# Patient Record
Sex: FEMALE | Race: OTHER | HISPANIC OR LATINO
[De-identification: names, ages, dates, MRNs, and addresses within clinical notes are randomized per-mention and may not be internally consistent; named-entity substitution may affect disease eponyms.]

---

## 2017-03-02 ENCOUNTER — APPOINTMENT (OUTPATIENT)
Dept: ORTHOPEDIC SURGERY | Facility: CLINIC | Age: 77
End: 2017-03-02

## 2017-03-02 VITALS
DIASTOLIC BLOOD PRESSURE: 68 MMHG | HEART RATE: 74 BPM | WEIGHT: 190 LBS | HEIGHT: 61 IN | SYSTOLIC BLOOD PRESSURE: 159 MMHG | BODY MASS INDEX: 35.87 KG/M2

## 2017-03-02 VITALS — BODY MASS INDEX: 35.87 KG/M2 | WEIGHT: 190 LBS | HEIGHT: 61 IN

## 2017-03-02 DIAGNOSIS — Z87.39 PERSONAL HISTORY OF OTHER DISEASES OF THE MUSCULOSKELETAL SYSTEM AND CONNECTIVE TISSUE: ICD-10-CM

## 2017-03-02 DIAGNOSIS — Z98.890 OTHER SPECIFIED POSTPROCEDURAL STATES: ICD-10-CM

## 2017-03-02 DIAGNOSIS — Z87.09 PERSONAL HISTORY OF OTHER DISEASES OF THE RESPIRATORY SYSTEM: ICD-10-CM

## 2017-03-02 DIAGNOSIS — Z82.61 FAMILY HISTORY OF ARTHRITIS: ICD-10-CM

## 2017-03-02 RX ORDER — DICLOFENAC SODIUM 75 MG/1
75 TABLET, DELAYED RELEASE ORAL
Qty: 60 | Refills: 1 | Status: ACTIVE | COMMUNITY
Start: 2017-03-02 | End: 1900-01-01

## 2017-06-08 ENCOUNTER — APPOINTMENT (OUTPATIENT)
Dept: CARDIOLOGY | Facility: HOSPITAL | Age: 77
End: 2017-06-08

## 2017-06-12 ENCOUNTER — RX RENEWAL (OUTPATIENT)
Age: 77
End: 2017-06-12

## 2017-06-22 ENCOUNTER — APPOINTMENT (OUTPATIENT)
Dept: ORTHOPEDIC SURGERY | Facility: CLINIC | Age: 77
End: 2017-06-22

## 2017-07-06 ENCOUNTER — APPOINTMENT (OUTPATIENT)
Dept: CARDIOLOGY | Facility: HOSPITAL | Age: 77
End: 2017-07-06

## 2017-07-20 ENCOUNTER — OUTPATIENT (OUTPATIENT)
Dept: OUTPATIENT SERVICES | Facility: HOSPITAL | Age: 77
LOS: 1 days | End: 2017-07-20
Payer: COMMERCIAL

## 2017-07-20 ENCOUNTER — APPOINTMENT (OUTPATIENT)
Dept: CARDIOLOGY | Facility: HOSPITAL | Age: 77
End: 2017-07-20

## 2017-07-20 VITALS
OXYGEN SATURATION: 95 % | HEIGHT: 61 IN | WEIGHT: 194 LBS | HEART RATE: 74 BPM | BODY MASS INDEX: 36.63 KG/M2 | DIASTOLIC BLOOD PRESSURE: 75 MMHG | SYSTOLIC BLOOD PRESSURE: 130 MMHG

## 2017-07-20 DIAGNOSIS — I25.10 ATHEROSCLEROTIC HEART DISEASE OF NATIVE CORONARY ARTERY WITHOUT ANGINA PECTORIS: ICD-10-CM

## 2017-07-20 DIAGNOSIS — I35.0 NONRHEUMATIC AORTIC (VALVE) STENOSIS: ICD-10-CM

## 2017-07-20 PROCEDURE — 93005 ELECTROCARDIOGRAM TRACING: CPT

## 2017-07-20 PROCEDURE — G0463: CPT

## 2017-07-27 ENCOUNTER — APPOINTMENT (OUTPATIENT)
Dept: ORTHOPEDIC SURGERY | Facility: CLINIC | Age: 77
End: 2017-07-27
Payer: MEDICARE

## 2017-07-27 VITALS
HEIGHT: 61 IN | DIASTOLIC BLOOD PRESSURE: 75 MMHG | HEART RATE: 64 BPM | SYSTOLIC BLOOD PRESSURE: 149 MMHG | WEIGHT: 194 LBS | BODY MASS INDEX: 36.63 KG/M2

## 2017-07-27 PROCEDURE — 99214 OFFICE O/P EST MOD 30 MIN: CPT

## 2017-07-27 RX ORDER — DICLOFENAC SODIUM 75 MG/1
75 TABLET, DELAYED RELEASE ORAL
Qty: 28 | Refills: 2 | Status: ACTIVE | COMMUNITY
Start: 2017-07-27 | End: 1900-01-01

## 2017-08-12 ENCOUNTER — FORM ENCOUNTER (OUTPATIENT)
Age: 77
End: 2017-08-12

## 2017-08-13 ENCOUNTER — APPOINTMENT (OUTPATIENT)
Dept: MRI IMAGING | Facility: IMAGING CENTER | Age: 77
End: 2017-08-13
Payer: MEDICARE

## 2017-08-13 ENCOUNTER — OUTPATIENT (OUTPATIENT)
Dept: OUTPATIENT SERVICES | Facility: HOSPITAL | Age: 77
LOS: 1 days | End: 2017-08-13
Payer: COMMERCIAL

## 2017-08-13 DIAGNOSIS — Z00.8 ENCOUNTER FOR OTHER GENERAL EXAMINATION: ICD-10-CM

## 2017-08-13 PROCEDURE — 73721 MRI JNT OF LWR EXTRE W/O DYE: CPT | Mod: 26,RT

## 2017-08-13 PROCEDURE — 73721 MRI JNT OF LWR EXTRE W/O DYE: CPT

## 2017-08-31 ENCOUNTER — APPOINTMENT (OUTPATIENT)
Dept: ORTHOPEDIC SURGERY | Facility: CLINIC | Age: 77
End: 2017-08-31
Payer: MEDICARE

## 2017-08-31 VITALS
HEART RATE: 73 BPM | DIASTOLIC BLOOD PRESSURE: 68 MMHG | HEIGHT: 61 IN | BODY MASS INDEX: 35.87 KG/M2 | WEIGHT: 190 LBS | SYSTOLIC BLOOD PRESSURE: 151 MMHG

## 2017-08-31 PROCEDURE — 99214 OFFICE O/P EST MOD 30 MIN: CPT

## 2017-08-31 RX ORDER — DICLOFENAC SODIUM 75 MG/1
75 TABLET, DELAYED RELEASE ORAL
Qty: 60 | Refills: 0 | Status: ACTIVE | COMMUNITY
Start: 2017-08-31 | End: 1900-01-01

## 2017-09-12 ENCOUNTER — OUTPATIENT (OUTPATIENT)
Dept: OUTPATIENT SERVICES | Facility: HOSPITAL | Age: 77
LOS: 1 days | End: 2017-09-12
Payer: COMMERCIAL

## 2017-09-12 ENCOUNTER — APPOINTMENT (OUTPATIENT)
Dept: CV DIAGNOSITCS | Facility: HOSPITAL | Age: 77
End: 2017-09-12

## 2017-09-12 DIAGNOSIS — I35.0 NONRHEUMATIC AORTIC (VALVE) STENOSIS: ICD-10-CM

## 2017-09-12 PROCEDURE — 93306 TTE W/DOPPLER COMPLETE: CPT | Mod: 26

## 2017-09-12 PROCEDURE — 93306 TTE W/DOPPLER COMPLETE: CPT

## 2018-02-09 ENCOUNTER — MEDICATION RENEWAL (OUTPATIENT)
Age: 78
End: 2018-02-09

## 2018-03-30 ENCOUNTER — APPOINTMENT (OUTPATIENT)
Dept: ORTHOPEDIC SURGERY | Facility: CLINIC | Age: 78
End: 2018-03-30

## 2018-05-10 ENCOUNTER — OUTPATIENT (OUTPATIENT)
Dept: OUTPATIENT SERVICES | Facility: HOSPITAL | Age: 78
LOS: 1 days | End: 2018-05-10
Payer: COMMERCIAL

## 2018-05-10 ENCOUNTER — APPOINTMENT (OUTPATIENT)
Dept: CARDIOLOGY | Facility: HOSPITAL | Age: 78
End: 2018-05-10

## 2018-05-10 ENCOUNTER — NON-APPOINTMENT (OUTPATIENT)
Age: 78
End: 2018-05-10

## 2018-05-10 VITALS — HEART RATE: 68 BPM | SYSTOLIC BLOOD PRESSURE: 125 MMHG | DIASTOLIC BLOOD PRESSURE: 67 MMHG | OXYGEN SATURATION: 99 %

## 2018-05-10 DIAGNOSIS — I73.9 PERIPHERAL VASCULAR DISEASE, UNSPECIFIED: ICD-10-CM

## 2018-05-10 DIAGNOSIS — I25.10 ATHEROSCLEROTIC HEART DISEASE OF NATIVE CORONARY ARTERY WITHOUT ANGINA PECTORIS: ICD-10-CM

## 2018-05-10 PROCEDURE — G0463: CPT

## 2018-05-10 PROCEDURE — 93005 ELECTROCARDIOGRAM TRACING: CPT

## 2018-05-31 ENCOUNTER — APPOINTMENT (OUTPATIENT)
Dept: ORTHOPEDIC SURGERY | Facility: CLINIC | Age: 78
End: 2018-05-31
Payer: MEDICARE

## 2018-05-31 VITALS
SYSTOLIC BLOOD PRESSURE: 152 MMHG | DIASTOLIC BLOOD PRESSURE: 61 MMHG | HEART RATE: 64 BPM | HEIGHT: 61 IN | BODY MASS INDEX: 35.5 KG/M2 | WEIGHT: 188 LBS

## 2018-05-31 PROCEDURE — 73562 X-RAY EXAM OF KNEE 3: CPT | Mod: RT

## 2018-05-31 PROCEDURE — 99214 OFFICE O/P EST MOD 30 MIN: CPT

## 2018-06-06 ENCOUNTER — APPOINTMENT (OUTPATIENT)
Dept: ULTRASOUND IMAGING | Facility: HOSPITAL | Age: 78
End: 2018-06-06

## 2018-06-06 ENCOUNTER — OUTPATIENT (OUTPATIENT)
Dept: OUTPATIENT SERVICES | Facility: HOSPITAL | Age: 78
LOS: 1 days | End: 2018-06-06
Payer: COMMERCIAL

## 2018-06-06 DIAGNOSIS — I65.29 OCCLUSION AND STENOSIS OF UNSPECIFIED CAROTID ARTERY: ICD-10-CM

## 2018-06-06 DIAGNOSIS — I73.9 PERIPHERAL VASCULAR DISEASE, UNSPECIFIED: ICD-10-CM

## 2018-06-06 PROCEDURE — 93880 EXTRACRANIAL BILAT STUDY: CPT | Mod: 26

## 2018-06-06 PROCEDURE — 93923 UPR/LXTR ART STDY 3+ LVLS: CPT | Mod: 26

## 2018-06-06 PROCEDURE — 93880 EXTRACRANIAL BILAT STUDY: CPT

## 2018-06-06 PROCEDURE — 93923 UPR/LXTR ART STDY 3+ LVLS: CPT

## 2018-06-15 RX ORDER — HYALURONATE SODIUM 20 MG/2 ML
20 SYRINGE (ML) INTRAARTICULAR
Qty: 3 | Refills: 0 | Status: ACTIVE | OUTPATIENT
Start: 2018-06-15

## 2018-06-19 ENCOUNTER — APPOINTMENT (OUTPATIENT)
Dept: PULMONOLOGY | Facility: CLINIC | Age: 78
End: 2018-06-19
Payer: MEDICARE

## 2018-06-19 VITALS
DIASTOLIC BLOOD PRESSURE: 70 MMHG | WEIGHT: 192 LBS | SYSTOLIC BLOOD PRESSURE: 110 MMHG | OXYGEN SATURATION: 98 % | HEART RATE: 59 BPM | BODY MASS INDEX: 36.28 KG/M2

## 2018-06-19 DIAGNOSIS — J45.909 UNSPECIFIED ASTHMA, UNCOMPLICATED: ICD-10-CM

## 2018-06-19 DIAGNOSIS — R06.02 SHORTNESS OF BREATH: ICD-10-CM

## 2018-06-19 PROCEDURE — 99214 OFFICE O/P EST MOD 30 MIN: CPT | Mod: 25

## 2018-06-19 PROCEDURE — 94727 GAS DIL/WSHOT DETER LNG VOL: CPT

## 2018-06-19 PROCEDURE — 94729 DIFFUSING CAPACITY: CPT

## 2018-06-19 PROCEDURE — 94060 EVALUATION OF WHEEZING: CPT

## 2018-06-27 PROBLEM — J45.909 AIRWAY HYPERREACTIVITY: Status: ACTIVE | Noted: 2018-06-27

## 2018-06-28 ENCOUNTER — APPOINTMENT (OUTPATIENT)
Dept: ORTHOPEDIC SURGERY | Facility: CLINIC | Age: 78
End: 2018-06-28
Payer: MEDICARE

## 2018-06-28 PROCEDURE — 20610 DRAIN/INJ JOINT/BURSA W/O US: CPT | Mod: RT

## 2018-07-05 ENCOUNTER — APPOINTMENT (OUTPATIENT)
Dept: ORTHOPEDIC SURGERY | Facility: CLINIC | Age: 78
End: 2018-07-05
Payer: MEDICARE

## 2018-07-05 VITALS
WEIGHT: 190 LBS | HEART RATE: 68 BPM | HEIGHT: 61 IN | SYSTOLIC BLOOD PRESSURE: 159 MMHG | DIASTOLIC BLOOD PRESSURE: 63 MMHG | BODY MASS INDEX: 35.87 KG/M2

## 2018-07-05 DIAGNOSIS — M23.303 OTHER MENISCUS DERANGEMENTS, UNSPECIFIED MEDIAL MENISCUS, RIGHT KNEE: ICD-10-CM

## 2018-07-05 PROCEDURE — 20610 DRAIN/INJ JOINT/BURSA W/O US: CPT | Mod: RT

## 2018-07-09 ENCOUNTER — APPOINTMENT (OUTPATIENT)
Dept: ORTHOPEDIC SURGERY | Facility: HOSPITAL | Age: 78
End: 2018-07-09

## 2018-07-12 ENCOUNTER — APPOINTMENT (OUTPATIENT)
Dept: ORTHOPEDIC SURGERY | Facility: CLINIC | Age: 78
End: 2018-07-12
Payer: MEDICARE

## 2018-07-12 VITALS
WEIGHT: 190 LBS | BODY MASS INDEX: 35.87 KG/M2 | DIASTOLIC BLOOD PRESSURE: 73 MMHG | HEIGHT: 61 IN | SYSTOLIC BLOOD PRESSURE: 153 MMHG | HEART RATE: 71 BPM

## 2018-07-12 PROCEDURE — 20610 DRAIN/INJ JOINT/BURSA W/O US: CPT | Mod: RT

## 2019-03-28 ENCOUNTER — APPOINTMENT (OUTPATIENT)
Dept: ORTHOPEDIC SURGERY | Facility: CLINIC | Age: 79
End: 2019-03-28

## 2019-09-19 ENCOUNTER — APPOINTMENT (OUTPATIENT)
Dept: ORTHOPEDIC SURGERY | Facility: CLINIC | Age: 79
End: 2019-09-19
Payer: MEDICARE

## 2019-09-19 VITALS
WEIGHT: 178 LBS | BODY MASS INDEX: 33.61 KG/M2 | SYSTOLIC BLOOD PRESSURE: 145 MMHG | DIASTOLIC BLOOD PRESSURE: 72 MMHG | HEIGHT: 61 IN | HEART RATE: 72 BPM

## 2019-09-19 DIAGNOSIS — M25.551 PAIN IN RIGHT HIP: ICD-10-CM

## 2019-09-19 PROCEDURE — 99214 OFFICE O/P EST MOD 30 MIN: CPT

## 2019-09-19 PROCEDURE — 72100 X-RAY EXAM L-S SPINE 2/3 VWS: CPT

## 2019-09-19 PROCEDURE — 73562 X-RAY EXAM OF KNEE 3: CPT | Mod: LT

## 2019-09-19 PROCEDURE — 72170 X-RAY EXAM OF PELVIS: CPT | Mod: RT

## 2019-09-19 RX ORDER — HYALURONATE SODIUM 20 MG/2 ML
20 SYRINGE (ML) INTRAARTICULAR
Qty: 2 | Refills: 0 | Status: ACTIVE | COMMUNITY
Start: 2019-09-19

## 2019-09-23 NOTE — PHYSICAL EXAM
[Antalgic] : antalgic [LE] : Sensory: Intact in bilateral lower extremities [Knee] : patellar 2+ and symmetric bilaterally [Ankle] : ankle 2+ and symmetric bilaterally [DP] : dorsalis pedis 2+ and symmetric bilaterally [PT] : posterior tibial 2+ and symmetric bilaterally [de-identified] : On general examination the patient is adequately groomed and nourished. The vital parameters are as recorded. \par There is no lymphedema or diffuse swelling, no varicose veins, no skin warmth/erythema/scars/swelling, no ulcers and no palpable lymph nodes or masses in both lower extremities. Bilateral pedal pulses are well palpable.\par Upper Extremity:\par Both right and left upper extremities are unremarkable in terms of skin rash, lesions, pigmentation, redness, tenderness, swelling, joint instability, abnormal deformity or crepitus. The overall range of motion, sensation, motor tone and strength testing are normal.\par \par Bilateral Knee Exam: \par The gait is bilateral stiff knee antalgic.\par Knee alignment:            Right 5 degrees varus with no flexion deformity. \par Left 5 degrees valgus with no flexion deformity.\par Both knees demonstrate no scars and the skin has no warmth, erythema, swelling or tenderness. \par Both knees have a range of motion of\par Extension:                    Right 0 degrees     Left 0 degrees\par Flexion:                                   Right 110 degrees      Left 110 degrees\par There is bilateral knee medial joint line tenderness. There is bilateral knee mild effusion. \par Romy's test is positive. Jose test is positive.\par Lachman's test, Anterior/Posterior Drawer test and Pivot Shift Tests are negative. \par There is bilateral knee grade 1 MCL mediolateral laxity and no anteroposterior instability. \par Patella compression test is positive and patellofemoral tracking is normal with no lateral subluxation, apprehension or instability. \par Right knee quadriceps and hamstrings power is 4+.\par Left knee quadriceps and hamstrings power is 4+.\par \par Hip Exam:\par The gait and station is normal\par The patient has equal leg lengths and no pelvic tilt. Dave/Kashif test is 7 inches on the right and 7 inches on the left. Active SLR is 60 degrees on the right and 60 degrees on the left. Both hips demonstrate no scars and the skin has no signs of inflammation or tenderness. \par Both Hips have a normal range of motion of flexion to 100 degrees, abduction 40 degrees, adduction 20 degrees, external rotation 40 degrees, internal rotation 20 degrees with symmetrical motion in flexion and extension. There is no flexion contracture, deformity or instability. Labral impingement tests are negative.\par Both hips flexor, abductor and extensor power is normal.\par \par Spine Exam:\par There is mild curvature of the spine with loss of normal lumbar lordosis. The skin is devoid of erythema, scars, pigmentation or rashes. There is mild lumbar spasm and tenderness especially at L4-L5 or L5-S1. \par The range of motion of the lumbar spine is limited by pain and spasm. Forward flexion is 80% normal, extension catch positive, lateral flexion and rotation 80% normal. There is no lumbar spine imbalance or instability detected.\par Bilateral passive SLR is right 40 degrees, left 40 degrees in supine and sitting positions. Lasegue's Test is negative.\par Neurology:\par The patient is alert and oriented in person, place and time. The mood is calm and affect is normal.\par Testing for coordination including Rhomberg's Test and Finger-Nose Test, sensation, motor tone and power and deep tendon reflexes in both lower extremities is normal.  [de-identified] : The following radiographs were ordered and read by me during this patients visit. I reviewed each radiograph in detail with the patient and discussed the findings as highlighted below. \par AP, lateral and skyline views of the bilateral knees confirm advanced degenerative joint disease with medial and lateral joint space narrowing and osteophyte formation, right varus, left valgus \par AP view of the pelvis are within normal limits \par AP and Lateral Radiographs of the lumbar spine reveal mild DJD.

## 2019-09-23 NOTE — HISTORY OF PRESENT ILLNESS
[8] : an average pain level of 8/10 [de-identified] : Ms. GERALD ORTIZ is a 79 year old female presenting with bilateral knee pain. She localizes the pain to the medial and anterior aspect of the bilateral knee. The patient describes the pain as dull and achy but sharp at times, and states it is intermittent, based on activity. She states the pain is exacerbated by walking long distance, climbing/descending stairs, and rising from a seated position. She has been taking NSAIDs for pain with only mild temporary relief. She admits to prior conservative management inclusive of physical therapy with only mild improvement in condition. Had gel injections to the right knee which worked well for many months but now pain has returned. Patient admits to some intermittent lower back pain. The patient admits to limitations in their quality of life, and is present to discuss options for treatment. JTM. \par Patient also complains of some right groin pain, and feels that the right leg is shorter than the left. \par Patient denies any other pertinent past medical, surgical, familial, of social history.

## 2019-09-23 NOTE — DISCUSSION/SUMMARY
[de-identified] : Bilateral knee advanced degenerative joint disease, Lumbar DJD, Potential Plantar Fasciitis\par The natural history and treatment of degenerative arthritis was discussed with the patient at length today. The spectrum of treatment including nonoperative modalities to surgical intervention was elucidated. Noninvasive and nonoperative treatment modalities include weight reduction, activity modification with low impact exercise,  as needed use of acetaminophen or anti-inflammatory medications if tolerated, glucosamine/chondroitin supplements, and physical therapy. Further treatments can include corticosteroid injection and the use of viscosupplementation with hyaluronic acid injections. Definitive surgical treatment can certainly include total joint arthroplasty also. The risks and benefits of each treatment options was discussed and all questions were answered.\par The patient was informed of the findings and recommended conservative management in the form of a home exercise program, activity modifications, stationary bicycling, swimming and weight loss program. A trial of Glucosamine and Chondroiten Sulphate was recommended.\par A prescription for a course of physical therapy was provided.\par Patient will use OTC NSAIDs as needed for pain. \par I have provided the patient with a referral to Dr. Garrison for evaluation of foot pain. \par I have recommended Euflexxa injections to the bilateral knee and the patient agreed to proceed, and the risks, benefits and side effects were discussed. Follow-up appointment was recommended once the injection is received in the office to begin the series \par

## 2019-10-08 ENCOUNTER — APPOINTMENT (OUTPATIENT)
Dept: ORTHOPEDIC SURGERY | Facility: CLINIC | Age: 79
End: 2019-10-08
Payer: MEDICARE

## 2019-10-08 DIAGNOSIS — M72.2 PLANTAR FASCIAL FIBROMATOSIS: ICD-10-CM

## 2019-10-08 DIAGNOSIS — M21.41 FLAT FOOT [PES PLANUS] (ACQUIRED), RIGHT FOOT: ICD-10-CM

## 2019-10-08 DIAGNOSIS — M20.11 HALLUX VALGUS (ACQUIRED), RIGHT FOOT: ICD-10-CM

## 2019-10-08 DIAGNOSIS — M20.12 HALLUX VALGUS (ACQUIRED), LEFT FOOT: ICD-10-CM

## 2019-10-08 DIAGNOSIS — M21.42 FLAT FOOT [PES PLANUS] (ACQUIRED), RIGHT FOOT: ICD-10-CM

## 2019-10-08 DIAGNOSIS — M62.89 OTHER SPECIFIED DISORDERS OF MUSCLE: ICD-10-CM

## 2019-10-08 PROCEDURE — 99214 OFFICE O/P EST MOD 30 MIN: CPT

## 2019-10-08 PROCEDURE — 73630 X-RAY EXAM OF FOOT: CPT | Mod: RT

## 2019-10-08 RX ORDER — MELOXICAM 7.5 MG/1
7.5 TABLET ORAL
Qty: 30 | Refills: 0 | Status: ACTIVE | COMMUNITY
Start: 2019-10-08 | End: 1900-01-01

## 2019-10-17 ENCOUNTER — APPOINTMENT (OUTPATIENT)
Dept: ORTHOPEDIC SURGERY | Facility: CLINIC | Age: 79
End: 2019-10-17
Payer: MEDICARE

## 2019-10-17 DIAGNOSIS — S83.8X1A SPRAIN OF OTHER SPECIFIED PARTS OF RIGHT KNEE, INITIAL ENCOUNTER: ICD-10-CM

## 2019-10-17 PROCEDURE — 20610 DRAIN/INJ JOINT/BURSA W/O US: CPT | Mod: LT

## 2019-10-17 NOTE — PROCEDURE
[Injection] : Injection [Bilateral] : of bilateral [Knee Joint] : knee joint [Osteoarthritis] : Osteoarthritis [Patient] : patient [Inflammation] : Inflammation [Alternatives] : alternatives [Risk] : risk [Benefits] : benefits [Infection] : infection [Bleeding] : bleeding [Allergic Reaction] : allergic reaction [Verbal Consent Obtained] : verbal consent was obtained prior to the procedure [Alcohol] : Alcohol [Ethyl Chloride Spray] : ethyl chloride spray was used as a topical anesthetic [Betadine] : Betadine [22] : a 22-gauge [2 mL Euflexxa___(lot #)] : 2mL Euflexxa ~Ulot# [unfilled] [Bandage Applied] : a bandage [Tolerated Well] : The patient tolerated the procedure well [None] : none

## 2019-10-24 ENCOUNTER — APPOINTMENT (OUTPATIENT)
Dept: ORTHOPEDIC SURGERY | Facility: CLINIC | Age: 79
End: 2019-10-24
Payer: MEDICARE

## 2019-10-24 PROCEDURE — 20610 DRAIN/INJ JOINT/BURSA W/O US: CPT | Mod: LT

## 2019-10-24 NOTE — PROCEDURE
[Bilateral] : of bilateral [Injection] : Injection [Knee Joint] : knee joint [Osteoarthritis] : Osteoarthritis [Patient] : patient [Inflammation] : Inflammation [Risk] : risk [Benefits] : benefits [Alternatives] : alternatives [Bleeding] : bleeding [Infection] : infection [Verbal Consent Obtained] : verbal consent was obtained prior to the procedure [Allergic Reaction] : allergic reaction [Alcohol] : Alcohol [Betadine] : Betadine [Ethyl Chloride Spray] : ethyl chloride spray was used as a topical anesthetic [22] : a 22-gauge [2 mL Euflexxa___(lot #)] : 2mL Euflexxa ~Ulot# [unfilled] [Tolerated Well] : The patient tolerated the procedure well [Bandage Applied] : a bandage [None] : none [de-identified] : The patient received the second set of injections to bilateral knees of Euflexxa and tolerated well. \par The patient has been instructed to ice and elevate to alleviate/reduce swelling. \par follow up appointment recommended next week for the third set of injections to bilateral knees.\par

## 2019-10-31 ENCOUNTER — APPOINTMENT (OUTPATIENT)
Dept: ORTHOPEDIC SURGERY | Facility: CLINIC | Age: 79
End: 2019-10-31
Payer: MEDICARE

## 2019-10-31 PROCEDURE — 20610 DRAIN/INJ JOINT/BURSA W/O US: CPT | Mod: LT

## 2019-11-01 PROBLEM — M20.11 HALLUX VALGUS OF RIGHT FOOT: Status: ACTIVE | Noted: 2019-11-01

## 2019-11-01 PROBLEM — M72.2 PLANTAR FASCIITIS: Status: RESOLVED | Noted: 2019-09-19 | Resolved: 2019-11-01

## 2019-11-01 PROBLEM — M72.2 PLANTAR FASCIITIS: Status: ACTIVE | Noted: 2019-11-01

## 2019-11-01 PROBLEM — M21.41 ACQUIRED PES PLANUS OF BOTH FEET: Status: ACTIVE | Noted: 2019-11-01

## 2019-11-01 PROBLEM — M62.89 TIGHTNESS OF BOTH GASTROCNEMIUS MUSCLES: Status: ACTIVE | Noted: 2019-11-01

## 2019-11-01 PROBLEM — M20.12 HALLUX VALGUS OF LEFT FOOT: Status: ACTIVE | Noted: 2019-11-01

## 2019-12-09 ENCOUNTER — APPOINTMENT (OUTPATIENT)
Dept: ORTHOPEDIC SURGERY | Facility: CLINIC | Age: 79
End: 2019-12-09

## 2019-12-17 NOTE — PROCEDURE
[Injection] : Injection [Bilateral] : of bilateral [Knee Joint] : knee joint [Osteoarthritis] : Osteoarthritis [Inflammation] : Inflammation [Patient] : patient [Risk] : risk [Benefits] : benefits [Alternatives] : alternatives [Bleeding] : bleeding [Infection] : infection [Allergic Reaction] : allergic reaction [Verbal Consent Obtained] : verbal consent was obtained prior to the procedure [Alcohol] : Alcohol [Betadine] : Betadine [Ethyl Chloride Spray] : ethyl chloride spray was used as a topical anesthetic [22] : a 22-gauge [2 mL Euflexxa___(lot #)] : 2mL Euflexxa ~Ulot# [unfilled] [Bandage Applied] : a bandage [Tolerated Well] : The patient tolerated the procedure well [None] : none [de-identified] : The patient received the third set of injections to bilateral knees of Euflexxa and tolerated well. \par The patient has been instructed to ice and elevate to alleviate/reduce swelling. \par follow up appointment recommended in four to six months. \par

## 2020-01-16 ENCOUNTER — APPOINTMENT (OUTPATIENT)
Dept: ORTHOPEDIC SURGERY | Facility: CLINIC | Age: 80
End: 2020-01-16
Payer: MEDICARE

## 2020-01-16 VITALS — DIASTOLIC BLOOD PRESSURE: 79 MMHG | HEART RATE: 67 BPM | SYSTOLIC BLOOD PRESSURE: 145 MMHG

## 2020-01-16 DIAGNOSIS — M47.816 SPONDYLOSIS W/OUT MYELOPATHY OR RADICULOPATHY, LUMBAR REGION: ICD-10-CM

## 2020-01-16 PROCEDURE — 99213 OFFICE O/P EST LOW 20 MIN: CPT

## 2020-01-21 NOTE — HISTORY OF PRESENT ILLNESS
[8] : an average pain level of 8/10 [Worsening] : worsening [Constant] : ~He/She~ states the symptoms seem to be constant [Walking] : worsened by walking [Rest] : relieved by rest [de-identified] : Ms. GERALD ORTIZ is a 79 year old female presenting with bilateral knee pain. She was last see in October, and received Euflexxa injections to both knees, and notes she has not had relief.  She states her symptoms have worsened since her last visit. She localizes the pain to the medial and anterior aspect of the bilateral knee. The patient describes the pain as dull and achy but sharp at times, and states it is intermittent, based on activity. She states the pain is exacerbated by walking long distance, climbing/descending stairs, and rising from a seated position. She has been taking NSAIDs for pain with only mild temporary relief. She admits to prior conservative management inclusive of physical therapy with only mild improvement in condition, and notes she does not feel this treatment is helpful for her. She was also seen by Dr. Garrison for her foot pain, and recommended to see a spine physician due to paraesthesias. Patient admits to some intermittent lower back pain and a history of spine surgery in the past. \par The patient admits to limitations in their quality of life, and is present to discuss options for treatment. She understands she may need total knee replacement, but would like to avoid this if she can and is looking for other non surgical options for treatment. ELIAZAR.

## 2020-01-21 NOTE — REASON FOR VISIT
[Follow-Up Visit] : a follow-up visit for [Knee Pain] : knee pain [FreeTextEntry2] : bilateral knee pain

## 2020-01-21 NOTE — PHYSICAL EXAM
[Antalgic] : antalgic [LE] : Sensory: Intact in bilateral lower extremities [Knee] : patellar 2+ and symmetric bilaterally [Ankle] : ankle 2+ and symmetric bilaterally [DP] : dorsalis pedis 2+ and symmetric bilaterally [PT] : posterior tibial 2+ and symmetric bilaterally [de-identified] : On general examination the patient is adequately groomed and nourished. The vital parameters are as recorded. \par There is no lymphedema or diffuse swelling, no varicose veins, no skin warmth/erythema/scars/swelling, no ulcers and no palpable lymph nodes or masses in both lower extremities. Bilateral pedal pulses are well palpable.\par Upper Extremity:\par Both right and left upper extremities are unremarkable in terms of skin rash, lesions, pigmentation, redness, tenderness, swelling, joint instability, abnormal deformity or crepitus. The overall range of motion, sensation, motor tone and strength testing are normal.\par \par Bilateral Knee Exam: \par The gait is bilateral stiff knee antalgic.\par Knee alignment:            Right 5 degrees varus with no flexion deformity. \par Left 5 degrees valgus with no flexion deformity.\par Both knees demonstrate no scars and the skin has no warmth, erythema, swelling or tenderness. \par Both knees have a range of motion of\par Extension:                    Right 0 degrees     Left 0 degrees\par Flexion:                                   Right 100 degrees      Left 100 degrees\par There is right knee medial joint line tenderness, and left knee lateral joint line tenderness.  There is bilateral knee mild effusion. \par Romy's test is positive. Jose test is positive.\par Lachman's test, Anterior/Posterior Drawer test and Pivot Shift Tests are negative. \par There is bilateral knee grade 1 MCL mediolateral laxity and no anteroposterior instability. \par Patella compression test is positive and patellofemoral tracking is normal with no lateral subluxation, apprehension or instability. \par Right knee quadriceps and hamstrings power is 4+.\par Left knee quadriceps and hamstrings power is 4+.\par \par Hip Exam:\par The gait and station is normal\par The patient has equal leg lengths and no pelvic tilt. Dave/Kashif test is 7 inches on the right and 7 inches on the left. Active SLR is 60 degrees on the right and 60 degrees on the left. Both hips demonstrate no scars and the skin has no signs of inflammation or tenderness. \par Both Hips have a normal range of motion of flexion to 100 degrees, abduction 40 degrees, adduction 20 degrees, external rotation 40 degrees, internal rotation 20 degrees with symmetrical motion in flexion and extension. There is no flexion contracture, deformity or instability. Labral impingement tests are negative.\par Both hips flexor, abductor and extensor power is normal.\par \par Spine Exam:\par There is mild curvature of the spine with loss of normal lumbar lordosis. The skin is devoid of erythema, scars, pigmentation or rashes. There is mild lumbar spasm and tenderness especially at L4-L5 or L5-S1. \par The range of motion of the lumbar spine is limited by pain and spasm. Forward flexion is 80% normal, extension catch positive, lateral flexion and rotation 80% normal. There is no lumbar spine imbalance or instability detected.\par Bilateral passive SLR is right 40 degrees, left 40 degrees in supine and sitting positions. Lasegue's Test is negative.\par Neurology:\par The patient is alert and oriented in person, place and time. The mood is calm and affect is normal.\par Testing for coordination including Rhomberg's Test and Finger-Nose Test, sensation, motor tone and power and deep tendon reflexes in both lower extremities is normal.  [de-identified] : The following radiographs were ordered last visit, and reviewed again this visit. I reviewed each radiograph in detail with the patient and discussed the findings as highlighted below. \par AP, lateral and skyline views of the bilateral knees confirm advanced degenerative joint disease with medial and lateral joint space narrowing and osteophyte formation, right varus, left valgus \par AP view of the pelvis are within normal limits \par AP and Lateral Radiographs of the lumbar spine reveal mild DJD.

## 2020-01-21 NOTE — DISCUSSION/SUMMARY
[de-identified] : Bilateral knee advanced degenerative joint disease, Lumbar DJD\par The natural history and treatment of degenerative arthritis was discussed with the patient at length today. The spectrum of treatment including nonoperative modalities to surgical intervention was elucidated. Noninvasive and nonoperative treatment modalities include weight reduction, activity modification with low impact exercise,  as needed use of acetaminophen or anti-inflammatory medications if tolerated, glucosamine/chondroitin supplements, and physical therapy. Further treatments can include corticosteroid injection and the use of viscosupplementation with hyaluronic acid injections. Definitive surgical treatment can certainly include total joint arthroplasty also. The risks and benefits of each treatment options was discussed and all questions were answered.\par The patient was informed of the findings and recommended conservative management in the form of a home exercise program, activity modifications, stationary bicycling, swimming and weight loss program. A trial of Glucosamine and Chondroiten Sulphate was recommended.\par A prescription for a course of physical therapy was provided.\par Patient will use OTC NSAIDs as needed for pain. \par I have provided the patient with a referral to Dr. Hernández for evaluation of the lumbar spine. \par Follow up recommended in 3 months.

## 2020-01-28 ENCOUNTER — APPOINTMENT (OUTPATIENT)
Dept: ORTHOPEDIC SURGERY | Facility: CLINIC | Age: 80
End: 2020-01-28
Payer: MEDICARE

## 2020-01-28 VITALS
HEIGHT: 55 IN | SYSTOLIC BLOOD PRESSURE: 176 MMHG | HEART RATE: 69 BPM | DIASTOLIC BLOOD PRESSURE: 60 MMHG | BODY MASS INDEX: 40.5 KG/M2 | WEIGHT: 175 LBS

## 2020-01-28 DIAGNOSIS — M47.814 SPONDYLOSIS W/OUT MYELOPATHY OR RADICULOPATHY, THORACIC REGION: ICD-10-CM

## 2020-01-28 DIAGNOSIS — M47.812 SPONDYLOSIS W/OUT MYELOPATHY OR RADICULOPATHY, CERVICAL REGION: ICD-10-CM

## 2020-01-28 PROCEDURE — 72040 X-RAY EXAM NECK SPINE 2-3 VW: CPT

## 2020-01-28 PROCEDURE — 99215 OFFICE O/P EST HI 40 MIN: CPT

## 2020-01-28 PROCEDURE — 72070 X-RAY EXAM THORAC SPINE 2VWS: CPT

## 2020-02-13 ENCOUNTER — APPOINTMENT (OUTPATIENT)
Dept: ORTHOPEDIC SURGERY | Facility: CLINIC | Age: 80
End: 2020-02-13
Payer: MEDICARE

## 2020-02-13 VITALS
WEIGHT: 175 LBS | HEIGHT: 55 IN | DIASTOLIC BLOOD PRESSURE: 71 MMHG | SYSTOLIC BLOOD PRESSURE: 141 MMHG | BODY MASS INDEX: 40.5 KG/M2 | HEART RATE: 79 BPM

## 2020-02-13 DIAGNOSIS — M17.0 BILATERAL PRIMARY OSTEOARTHRITIS OF KNEE: ICD-10-CM

## 2020-02-13 PROCEDURE — 73562 X-RAY EXAM OF KNEE 3: CPT | Mod: LT

## 2020-02-13 PROCEDURE — 99214 OFFICE O/P EST MOD 30 MIN: CPT

## 2020-02-18 PROBLEM — M17.0 LOCALIZED OSTEOARTHRITIS OF BOTH KNEES: Status: ACTIVE | Noted: 2017-03-02

## 2020-02-18 NOTE — DISCUSSION/SUMMARY
[de-identified] : Bilateral knee advanced degenerative joint disease, Right knee more advanced. \par \par The natural history and treatment of degenerative arthritis was discussed with the patient at length today. The spectrum of treatment including nonoperative modalities to surgical intervention was elucidated. Noninvasive and nonoperative treatment modalities include weight reduction, activity modification with low impact exercise,  as needed use of acetaminophen or anti-inflammatory medications if tolerated, glucosamine/chondroitin supplements, and physical therapy. Further treatments can include corticosteroid injection and the use of viscosupplementation with hyaluronic acid injections. Definitive surgical treatment can certainly include total joint arthroplasty also. The risks and benefits of each treatment options was discussed and all questions were answered.\par In view of lack of adequate pain relief with conservative (non-surgical) management protocol including physical therapy, home exercises, weight loss, activity modification, NSAIDS; the patient is recommended to consider a right Total Knee Replacement. \par \par The risks, benefits, alternatives, implications, complications including but not limited to pain, stiffness, bleeding, limp, wound breakdown, infection, bone fracture, nerve and vascular compromise, implant wear, fixation options depending on bone quality, instability, and durability issues and rehabilitation were discussed and relevant questions were addressed. The possibility of recurrent pain, no improvement in pain and actual worsening of the pain were also mentioned in conversation with the patient. Medical complications related to the patient's general medical health including deep vein thrombosis, pulmonary embolus, heart attack, stroke, death and other complications from anesthesia were discussed as well.  Anticoagulation prophylaxis medication options to address risks of deep vein thrombosis and pulmonary embolism were discussed and weighed against the risks of bleeding and wound healing complications. The patient elected Ecotrin/Xarelto prophylaxis with mechanical modalities.\par \par  I have reviewed the plan of care as well as a model of a total knee replacement implant equivalent to the one that will be used for their total knee replacement.  The patient agrees with the plan of care, as well as the use of implants for their total knee replacement.  The patient wishes to proceed and will undergo preoperative medical evaluation and clearance, attend the preoperative educational class and will schedule surgery appropriately.\par

## 2020-02-18 NOTE — PHYSICAL EXAM
[Antalgic] : antalgic [Knee] : patellar 2+ and symmetric bilaterally [LE] : 5/5 motor strength in bilateral lower extremities [PT] : posterior tibial 2+ and symmetric bilaterally [Ankle] : ankle 2+ and symmetric bilaterally [DP] : dorsalis pedis 2+ and symmetric bilaterally [de-identified] : On general examination the patient is adequately groomed and nourished. The vital parameters are as recorded. \par There is no lymphedema or diffuse swelling, no varicose veins, no skin warmth/erythema/scars/swelling, no ulcers and no palpable lymph nodes or masses in both lower extremities. Bilateral pedal pulses are well palpable.\par Upper Extremity:\par Both right and left upper extremities are unremarkable in terms of skin rash, lesions, pigmentation, redness, tenderness, swelling, joint instability, abnormal deformity or crepitus. The overall range of motion, sensation, motor tone and strength testing are normal.\par \par Bilateral Knee Exam: \par The gait is bilateral stiff knee antalgic.\par Knee alignment:            Right 5 degrees varus with + flexion deformity. \par Left 5 degrees valgus with no flexion deformity.\par Both knees demonstrate no scars and the skin has no warmth, erythema, swelling or tenderness. \par Both knees have a range of motion of\par Extension:                    Right -3 degrees     Left 0 degrees\par Flexion:                                   Right 100 degrees      Left 100 degrees\par There is right knee medial joint line tenderness, and left knee lateral joint line tenderness.  There is bilateral knee mild effusion. \par Romy's test is positive. Jose test is positive.\par Lachman's test, Anterior/Posterior Drawer test and Pivot Shift Tests are negative. \par There is bilateral knee mediolateral laxity and no anteroposterior instability. \par Patella compression test is positive and patellofemoral tracking is normal with no lateral subluxation, apprehension or instability. \par Right knee quadriceps and hamstrings power is 4+.\par Left knee quadriceps and hamstrings power is 4+.\par \par Hip Exam:\par The gait and station is normal\par The patient has equal leg lengths and no pelvic tilt. Dave/Kashif test is 7 inches on the right and 7 inches on the left. Active SLR is 60 degrees on the right and 60 degrees on the left. Both hips demonstrate no scars and the skin has no signs of inflammation or tenderness. \par Both Hips have a normal range of motion of flexion to 100 degrees, abduction 40 degrees, adduction 20 degrees, external rotation 40 degrees, internal rotation 20 degrees with symmetrical motion in flexion and extension. There is no flexion contracture, deformity or instability. Labral impingement tests are negative.\par Both hips flexor, abductor and extensor power is normal.\par \par Spine Exam:\par There is mild curvature of the spine with loss of normal lumbar lordosis. The skin is devoid of erythema, scars, pigmentation or rashes. There is mild lumbar spasm and tenderness especially at L4-L5 or L5-S1. \par The range of motion of the lumbar spine is limited by pain and spasm. Forward flexion is 80% normal, extension catch positive, lateral flexion and rotation 80% normal. There is no lumbar spine imbalance or instability detected.\par Bilateral passive SLR is right 40 degrees, left 40 degrees in supine and sitting positions. Lasegue's Test is negative.\par Neurology:\par The patient is alert and oriented in person, place and time. The mood is calm and affect is normal.\par Testing for coordination including Rhomberg's Test and Finger-Nose Test, sensation, motor tone and power and deep tendon reflexes in both lower extremities is normal.  [de-identified] : The following radiographs were ordered and read by me during this patients visit. I reviewed each radiograph in detail with the patient and discussed the findings as highlighted below. \par AP, lateral and skyline views of the bilateral knees confirm advanced degenerative joint disease with medial and lateral joint space narrowing and osteophyte formation, right varus, left valgus \par \par AP view of the pelvis taken previously are within normal limits \par AP and Lateral Radiographs of the lumbar spine taken previously reveal mild DJD.

## 2020-02-18 NOTE — REASON FOR VISIT
[Knee Pain] : knee pain [Follow-Up Visit] : a follow-up visit for [FreeTextEntry2] : bilateral knee pain

## 2020-02-18 NOTE — HISTORY OF PRESENT ILLNESS
[Worsening] : worsening [Constant] : ~He/She~ states the symptoms seem to be constant [Walking] : worsened by walking [8] : an average pain level of 8/10 [Rest] : relieved by rest [de-identified] : Ms. GERALD ORTIZ is a 79 year old female presenting with bilateral knee pain. She was last seen in January and treated conservatively for bilateral knee DJD. She received Euflexxa injections to both knees last in October 2019, and notes she has not had relief.  She states her symptoms have worsened since her last visit. She localizes the pain to the medial and anterior aspect of the bilateral knee, right knee more than left. The patient describes the pain as dull and achy but sharp at times, and states it is intermittent, based on activity. She states the pain is exacerbated by walking long distance, climbing/descending stairs, and rising from a seated position. She has been taking NSAIDs for pain with only mild temporary relief. She admits to prior conservative management inclusive of physical therapy with only mild improvement in condition, and notes she does not feel this treatment is helpful for her. She was also seen by Dr. Garrison for her foot pain, and recommended to see a spine physician due to paraesthesias. Patient admits to some intermittent lower back pain and a history of spine surgery in the past. She was recently evaluated by Dr. Hernández January 28, and advised on conservative therapy for her lower back. She was notified that there is no indication for surgical intervention on the lower back. \par The patient admits to limitations in their quality of life, and is present to discuss options for treatment. She would like to discuss total knee replacement at this time due to the worsening pain and limitations.

## 2020-04-03 ENCOUNTER — TRANSCRIPTION ENCOUNTER (OUTPATIENT)
Age: 80
End: 2020-04-03

## 2020-04-10 ENCOUNTER — EMERGENCY (EMERGENCY)
Facility: HOSPITAL | Age: 80
LOS: 1 days | Discharge: HOME | End: 2020-04-10
Attending: EMERGENCY MEDICINE
Payer: COMMERCIAL

## 2020-04-10 VITALS
HEIGHT: 61 IN | SYSTOLIC BLOOD PRESSURE: 177 MMHG | DIASTOLIC BLOOD PRESSURE: 72 MMHG | TEMPERATURE: 99 F | RESPIRATION RATE: 18 BRPM | OXYGEN SATURATION: 100 % | WEIGHT: 177.91 LBS | HEART RATE: 69 BPM

## 2020-04-10 VITALS
RESPIRATION RATE: 16 BRPM | SYSTOLIC BLOOD PRESSURE: 175 MMHG | DIASTOLIC BLOOD PRESSURE: 54 MMHG | OXYGEN SATURATION: 99 % | HEART RATE: 62 BPM | TEMPERATURE: 98 F

## 2020-04-10 LAB
ALBUMIN SERPL ELPH-MCNC: 4.4 G/DL — SIGNIFICANT CHANGE UP (ref 3.3–5)
ALP SERPL-CCNC: 64 U/L — SIGNIFICANT CHANGE UP (ref 40–120)
ALT FLD-CCNC: 8 U/L — LOW (ref 10–45)
ANION GAP SERPL CALC-SCNC: 14 MMOL/L — SIGNIFICANT CHANGE UP (ref 5–17)
APPEARANCE UR: CLEAR — SIGNIFICANT CHANGE UP
AST SERPL-CCNC: 13 U/L — SIGNIFICANT CHANGE UP (ref 10–40)
BILIRUB SERPL-MCNC: 0.9 MG/DL — SIGNIFICANT CHANGE UP (ref 0.2–1.2)
BILIRUB UR-MCNC: NEGATIVE — SIGNIFICANT CHANGE UP
BUN SERPL-MCNC: 24 MG/DL — HIGH (ref 7–23)
CALCIUM SERPL-MCNC: 10 MG/DL — SIGNIFICANT CHANGE UP (ref 8.4–10.5)
CHLORIDE SERPL-SCNC: 101 MMOL/L — SIGNIFICANT CHANGE UP (ref 96–108)
CO2 SERPL-SCNC: 25 MMOL/L — SIGNIFICANT CHANGE UP (ref 22–31)
COLOR SPEC: SIGNIFICANT CHANGE UP
CREAT SERPL-MCNC: 1.43 MG/DL — HIGH (ref 0.5–1.3)
DIFF PNL FLD: NEGATIVE — SIGNIFICANT CHANGE UP
GLUCOSE SERPL-MCNC: 111 MG/DL — HIGH (ref 70–99)
GLUCOSE UR QL: NEGATIVE — SIGNIFICANT CHANGE UP
HCT VFR BLD CALC: 40.4 % — SIGNIFICANT CHANGE UP (ref 34.5–45)
HGB BLD-MCNC: 12.8 G/DL — SIGNIFICANT CHANGE UP (ref 11.5–15.5)
KETONES UR-MCNC: NEGATIVE — SIGNIFICANT CHANGE UP
LEUKOCYTE ESTERASE UR-ACNC: NEGATIVE — SIGNIFICANT CHANGE UP
MCHC RBC-ENTMCNC: 30.7 PG — SIGNIFICANT CHANGE UP (ref 27–34)
MCHC RBC-ENTMCNC: 31.7 GM/DL — LOW (ref 32–36)
MCV RBC AUTO: 96.9 FL — SIGNIFICANT CHANGE UP (ref 80–100)
NITRITE UR-MCNC: NEGATIVE — SIGNIFICANT CHANGE UP
NRBC # BLD: 0 /100 WBCS — SIGNIFICANT CHANGE UP (ref 0–0)
PH UR: 7 — SIGNIFICANT CHANGE UP (ref 5–8)
PLATELET # BLD AUTO: 185 K/UL — SIGNIFICANT CHANGE UP (ref 150–400)
POTASSIUM SERPL-MCNC: 4.9 MMOL/L — SIGNIFICANT CHANGE UP (ref 3.5–5.3)
POTASSIUM SERPL-SCNC: 4.9 MMOL/L — SIGNIFICANT CHANGE UP (ref 3.5–5.3)
PROT SERPL-MCNC: 7.2 G/DL — SIGNIFICANT CHANGE UP (ref 6–8.3)
PROT UR-MCNC: NEGATIVE — SIGNIFICANT CHANGE UP
RBC # BLD: 4.17 M/UL — SIGNIFICANT CHANGE UP (ref 3.8–5.2)
RBC # FLD: 12.5 % — SIGNIFICANT CHANGE UP (ref 10.3–14.5)
SODIUM SERPL-SCNC: 140 MMOL/L — SIGNIFICANT CHANGE UP (ref 135–145)
SP GR SPEC: 1.01 — SIGNIFICANT CHANGE UP (ref 1.01–1.02)
UROBILINOGEN FLD QL: NEGATIVE — SIGNIFICANT CHANGE UP
WBC # BLD: 5.83 K/UL — SIGNIFICANT CHANGE UP (ref 3.8–10.5)
WBC # FLD AUTO: 5.83 K/UL — SIGNIFICANT CHANGE UP (ref 3.8–10.5)

## 2020-04-10 PROCEDURE — 99284 EMERGENCY DEPT VISIT MOD MDM: CPT | Mod: 25

## 2020-04-10 PROCEDURE — 85027 COMPLETE CBC AUTOMATED: CPT

## 2020-04-10 PROCEDURE — 80053 COMPREHEN METABOLIC PANEL: CPT

## 2020-04-10 PROCEDURE — 96360 HYDRATION IV INFUSION INIT: CPT | Mod: XU

## 2020-04-10 PROCEDURE — 74177 CT ABD & PELVIS W/CONTRAST: CPT | Mod: 26

## 2020-04-10 PROCEDURE — 81003 URINALYSIS AUTO W/O SCOPE: CPT

## 2020-04-10 PROCEDURE — 99285 EMERGENCY DEPT VISIT HI MDM: CPT

## 2020-04-10 PROCEDURE — 74177 CT ABD & PELVIS W/CONTRAST: CPT

## 2020-04-10 RX ORDER — SODIUM CHLORIDE 9 MG/ML
500 INJECTION INTRAMUSCULAR; INTRAVENOUS; SUBCUTANEOUS ONCE
Refills: 0 | Status: COMPLETED | OUTPATIENT
Start: 2020-04-10 | End: 2020-04-10

## 2020-04-10 RX ORDER — MULTIVIT WITH MIN/MFOLATE/K2 340-15/3 G
1 POWDER (GRAM) ORAL ONCE
Refills: 0 | Status: COMPLETED | OUTPATIENT
Start: 2020-04-10 | End: 2020-04-10

## 2020-04-10 RX ADMIN — SODIUM CHLORIDE 500 MILLILITER(S): 9 INJECTION INTRAMUSCULAR; INTRAVENOUS; SUBCUTANEOUS at 14:13

## 2020-04-10 RX ADMIN — SODIUM CHLORIDE 500 MILLILITER(S): 9 INJECTION INTRAMUSCULAR; INTRAVENOUS; SUBCUTANEOUS at 15:15

## 2020-04-10 RX ADMIN — Medication 1 BOTTLE: at 14:28

## 2020-04-10 NOTE — ED PROVIDER NOTE - PATIENT PORTAL LINK FT
You can access the FollowMyHealth Patient Portal offered by [facility] by registering at the following website: [email or web address]. By joining [facility]’s FollowMyHealth portal, you will also be able to view your health information using other applications (apps) compatible with our system.

## 2020-04-10 NOTE — ED ADULT TRIAGE NOTE - CHIEF COMPLAINT QUOTE
nausea, lower abdominal pain and constipation x 8 days.  Tried suppositories no BM.  Was swabbed at urgent care last week doesn't know results.

## 2020-04-10 NOTE — ED PROVIDER NOTE - CARE PLAN
Principal Discharge DX:	Constipation  Secondary Diagnosis:	Suspected 2019 novel coronavirus infection

## 2020-04-10 NOTE — ED PROVIDER NOTE - NSFOLLOWUPINSTRUCTIONS_ED_ALL_ED_FT
Please constipation discharge instruction.  Follow up with your primary Dr. for reevaluation and repeat kidney function, call Monday for an appointment. .     You were seen and evaluated in the Emergency Department for your viral upper respiratory-like, possible COVID. As we are not testing patients for COVID that are stable for discharge, you should self-quarantine for 2 weeks for presumed COVID. Please see the attached COVID information packet for management of your symptoms, and more information on the next steps including your self-quarantine measures.     At this time your clinical evaluation and history do not demonstrate any acute, life-threatening medical conditions warranting emergent treatment. We strongly recommend you set up a follow up with your primary care doctor or with our Internal Medicine clinic (see contact information below).    [facility]  General Internal Medicine  [address] [place] [address]  Phone: [phone]    Continue to maintain oral hydration, with plenty of fluids; take Tylenol (following the instructions on the medication insert information sheet for dosing) for fever control.  Do not exceed 3000mg in 24 hours of acetaminophen (Tylenol).     Should you develop new or worsening symptoms including but not limited to chest pain, shortness of breath, abdominal pain, fevers, vomiting, diarrhea - please return to the ED for immediate evaluation.

## 2020-04-10 NOTE — ED PROVIDER NOTE - PMH
Adult hypothyroidism    Asthma    GERD (gastroesophageal reflux disease)    HLD (hyperlipidemia)    Lower extremity edema  right leg  Personal History of Hypertension    Severe aortic stenosis

## 2020-04-10 NOTE — ED PROVIDER NOTE - ATTENDING CONTRIBUTION TO CARE
81 yo female presents c/o constipation for 8 days.  Pt c/o sore throat, dizziness, diarrhea x 1 week 8 days  with benign abd but concerned since she has not been constipated like this before, nl colonscopy a few yrs back , only had a tubal ligation when she was younger, no resp complaints, lungs cta b/l, ct a/p ordered, labs, enema reassess.

## 2020-04-10 NOTE — ED ADULT NURSE NOTE - OBJECTIVE STATEMENT
1047 80 yr old HF c/o suprapubic pain, nausea and constipation x 8 days. Was evaluated at [facility] 8 days ago for tx of sore throat, nausea, diarrhea and weakness. States no BM x 7 days. Was tested for COVID 19. states no results yet. A&Ox4. ambulatory. Airborne and contact isolation maintained. Denies dysuria. states urine has been dark orange. Denies fever or chills. No back pain. airborne and contact isolation maintained. abd soft. Non TTP

## 2020-04-10 NOTE — ED PROVIDER NOTE - PHYSICAL EXAMINATION
NAD, VSS, Afebrile, Neck supple. Lungs clear. No CVA tender. + Mild lower abd tender. Neuro- intact.

## 2020-04-10 NOTE — ED PROVIDER NOTE - PROGRESS NOTE DETAILS
+ Small amount of stools after enema. Pt stated feeling better after BM. ABD soft, non tender. Pt stated feeling better after BM. ABD soft, non tender. Informed Elevated Cr. and will f/u with PMD.

## 2020-04-10 NOTE — ED PROVIDER NOTE - OBJECTIVE STATEMENT
81 yo female presents c/o constipation for 8 days.  Pt c/o sore throat, dizziness, diarrhea x 1 week 8 days ago and was tested for covid, but pending results, was tested 4/3. 81 yo female presents c/o constipation for 8 days.  Pt c/o sore throat, dizziness, diarrhea x 1 week 8 days ago and was tested for covid, but pending results, was tested at an urgent care on 4/3.  Pt states she did not take anything to make the diarrhea stop. Took suppositories twice without relief of the constipation. 81 yo female presents c/o constipation for 8 days.  Pt c/o sore throat, dizziness, diarrhea x 1 week 8 days ago and was tested for covid, but pending results, was tested at an urgent care on 4/3.  Pt states she did not take anything to make the diarrhea stop. Took suppositories twice without relief of the constipation.  Pt c/o orange urine and lower abdominal pressure. denies fevers, chills, dysuria, vomiting. +nausea

## 2020-05-20 ENCOUNTER — APPOINTMENT (OUTPATIENT)
Dept: ORTHOPEDIC SURGERY | Facility: HOSPITAL | Age: 80
End: 2020-05-20

## 2020-06-03 ENCOUNTER — APPOINTMENT (OUTPATIENT)
Dept: ORTHOPEDIC SURGERY | Facility: CLINIC | Age: 80
End: 2020-06-03

## 2020-07-13 ENCOUNTER — FORM ENCOUNTER (OUTPATIENT)
Age: 80
End: 2020-07-13

## 2021-01-07 ENCOUNTER — APPOINTMENT (OUTPATIENT)
Dept: ORTHOPEDIC SURGERY | Facility: CLINIC | Age: 81
End: 2021-01-07
Payer: MEDICARE

## 2021-01-07 VITALS
BODY MASS INDEX: 35.34 KG/M2 | HEART RATE: 67 BPM | TEMPERATURE: 96.7 F | DIASTOLIC BLOOD PRESSURE: 74 MMHG | SYSTOLIC BLOOD PRESSURE: 148 MMHG | HEIGHT: 60 IN | WEIGHT: 180 LBS

## 2021-01-07 DIAGNOSIS — M67.911 UNSPECIFIED DISORDER OF SYNOVIUM AND TENDON, RIGHT SHOULDER: ICD-10-CM

## 2021-01-07 PROCEDURE — 99072 ADDL SUPL MATRL&STAF TM PHE: CPT

## 2021-01-07 PROCEDURE — 73030 X-RAY EXAM OF SHOULDER: CPT | Mod: RT

## 2021-01-07 PROCEDURE — 20610 DRAIN/INJ JOINT/BURSA W/O US: CPT | Mod: RT

## 2021-01-07 PROCEDURE — 99204 OFFICE O/P NEW MOD 45 MIN: CPT

## 2021-01-07 NOTE — PHYSICAL EXAM
[de-identified] : Constitutional: Well-nourished, well-developed, No acute distress\par Respiratory:  Good respiratory effort, no SOB\par Lymphatic: No regional lymphadenopathy, no lymphedema\par Psychiatric: Pleasant and normal affect, alert and oriented x3\par Skin: Clean dry and intact B/L UE/LE\par Musculoskeletal: normal except where as noted in regional exam\par \par \par Left Shoulder:\par APPEARANCE: no marked deformities, no swelling or malalignment\par POSITIVE TENDERNESS: none\par NONTENDER: supraspinatus, infraspinatus, teres minor, LH biceps, anterior and posterior capsule, AC joint\par ROM: full & painless, no scapular winging or dyskinesia present\par RESISTIVE TESTING: painless 5/5 resisted flex/ext, empty can/ER/IR, horizontal abd/add \par SPECIAL TESTS: neg Drop Arm, neg Empty Can, neg Orozoc/Neers, neg Angel's, neg Speeds, neg Apprehension, neg cross arm adduction, neg apley's scratch test\par Vasc: 2+ radial pulse\par Neuro: AIN, PIN, Ulnar nerve intact to motor, DTRs 2+/4 biceps, triceps, brachioradialis\par Sensation: Intact to light touch throughout\par B/L Elbows:  No asymmetry, malalignment, or swelling, Full ROM, 5/5 strength in flexion/ext, pronation/supination, Joints stable\par B/L Wrist and Hand:  No asymmetry, malalignment, or swelling, Full ROM, 5/5 strength in wrist and long finger flexion/ext, radial/ulnar deviation, Joints stable\par \par Right Shoulder:\par APPEARANCE: no marked deformities, no swelling or malalignment\par POSITIVE TENDERNESS: supraspinatus, long head biceps tendon\par NONTENDER:  infraspinatus, teres minor. biceps. anterior and posterior capsule. AC joint. \par ROM: flexion limited to 120 deg, abduction to 100 deg, moderate painful arc past 60 degrees, no scapular winging or dyskinesia present\par RESISTIVE TESTING: MMT 4/5 ER, Flexion and Empty can, IR. painless 5/5 resisted ext, horizontal abd/add \par SPECIAL TESTS: + Orozco and Neers, mildly + cross arm adduction, + Speeds, neg Angel's, neg Drop Arm, neg Apprehension. neg apley's scratch test\par Vasc: 2+ radial pulse\par Neuro: AIN, PIN, Ulnar nerve intact to motor, DTRs 2+/4 biceps, triceps, brachioradialis\par Sensation: Intact to light touch throughout\par  [de-identified] : The following radiographs were ordered and read by me during this patient's visit. I reviewed each radiograph in detail with the patient and discussed the findings as highlighted below. \par \par 3 views of the right shoulder were obtained today that show degenerative changes and evidence of Moderate GH osteoarthritis with superior migration of the humeral head.  No fracture, or dislocation seen at this time. There is no malalignment. No other obvious osseous abnormality. Otherwise unremarkable.\par \par

## 2021-01-07 NOTE — CONSULT LETTER
[Dear  ___] : Dear  [unfilled], [Consult Letter:] : I had the pleasure of evaluating your patient, [unfilled]. [Please see my note below.] : Please see my note below. [Sincerely,] : Sincerely, [FreeTextEntry2] : PCP - Dr. Whitfield\par [address] [facility] [address], [place] [address]\par  [FreeTextEntry3] : Micah Ascencio DO, ATC\par Primary Care Sports Medicine\par [facility]\par

## 2021-01-07 NOTE — HISTORY OF PRESENT ILLNESS
[de-identified] : Patient is here for right neck, shoulder and arm pain that began about 2 months ago without inciting injury. She states that the pain is worse with movement and at night. She has a burning pain that can radiate down her arm.  She was prescribed Meloxicam by her PCP which has provided some relief. Denies prior injury. \par \par The patient's past medical history, past surgical history, medications and allergies were reviewed by me today and documented accordingly. In addition, the patient's family and social history, which were noncontributory to this visit, were reviewed also. Intake form was reviewed. The patient has no family history of arthritis.

## 2021-01-07 NOTE — DISCUSSION/SUMMARY
[de-identified] : Discussed findings of today's exam and possible causes of patient's pain.  Educated patient on their most probable diagnosis of right shoulder pain due to subacromial impingement with exacerbation of underlying likely chronic rotator cuff pathology evidenced by superior migration of the humeral head on x-ray today.  Reviewed possible courses of treatment, and we collaboratively decided best course of treatment at this time will include conservative management.  We discussed various treatment options as well as associated risk/benefits/alternatives and patient elected to proceed with cortisone injection today (see procedure note).  Informed the patient that the numbing medicine in today's injection will last for about 4-6 hours. The steroid that was injected will start to work in 1 to 2 days, peak at 1-2 weeks, and may last up to 1-2 months.  Patient will be started on a course of physical therapy to restore normal range of motion and strength as tolerated.  Patient may continue taking Mobic as needed for pain as prescribed by her PCP.  Follow up as needed.  Patient appreciates and agrees with current plan.\par \par This note was generated using dragon medical dictation software.  A reasonable effort has been made for proofreading its contents, but typos may still remain.  If there are any questions or points of clarification needed please notify my office.

## 2021-01-07 NOTE — PROCEDURE
[de-identified] : Injection: Right  Shoulder Subacromial Space.\par Indication: Impingement.\par \par A discussion was had with the patient regarding this procedure and all questions were answered. All risks, benefits and alternatives were discussed. These included but were not limited to bleeding, infection, and allergic reaction.  A timeout was done to ensure correct side and pt agreed to the procedure.   Alcohol was used to clean the skin, and betadine was used to sterilize and prep the area in the posterior aspect of the shoulder. Ethyl chloride spray was then used as a topical anesthetic. A 22-gauge 1.5" needle was used to inject 2cc of 0.25% bupivacaine and 1cc of 40mg/ml methylprednisolone into the subacromial space. A sterile bandage was then applied. The patient tolerated the procedure well and there were no complications.\par

## 2021-03-23 ENCOUNTER — LABORATORY RESULT (OUTPATIENT)
Age: 81
End: 2021-03-23

## 2021-03-23 ENCOUNTER — OUTPATIENT (OUTPATIENT)
Dept: OUTPATIENT SERVICES | Facility: HOSPITAL | Age: 81
LOS: 1 days | Discharge: HOME | End: 2021-03-23

## 2021-03-23 ENCOUNTER — APPOINTMENT (OUTPATIENT)
Dept: OTOLARYNGOLOGY | Facility: CLINIC | Age: 81
End: 2021-03-23
Payer: MEDICARE

## 2021-03-23 VITALS
SYSTOLIC BLOOD PRESSURE: 127 MMHG | DIASTOLIC BLOOD PRESSURE: 69 MMHG | WEIGHT: 178 LBS | BODY MASS INDEX: 34.95 KG/M2 | HEART RATE: 62 BPM | HEIGHT: 60 IN

## 2021-03-23 DIAGNOSIS — Z87.19 PERSONAL HISTORY OF OTHER DISEASES OF THE DIGESTIVE SYSTEM: ICD-10-CM

## 2021-03-23 PROCEDURE — 99072 ADDL SUPL MATRL&STAF TM PHE: CPT

## 2021-03-23 PROCEDURE — 31575 DIAGNOSTIC LARYNGOSCOPY: CPT

## 2021-03-23 PROCEDURE — 41100 BIOPSY OF TONGUE: CPT

## 2021-03-23 PROCEDURE — 99204 OFFICE O/P NEW MOD 45 MIN: CPT | Mod: 25

## 2021-03-23 NOTE — DATA REVIEWED
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

## 2021-03-23 NOTE — PHYSICAL EXAM
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 2 x 0.5 cm.  No TTP, no ulceration.  There is a very small area of leukoplakic changes, approx. 2-3mm along the tip of the tongue.  No other lesions. [Normal] : no rashes

## 2021-03-23 NOTE — CONSULT LETTER
[Dear  ___] : Dear  [unfilled], [Consult Letter:] : I had the pleasure of evaluating your patient, [unfilled]. [Please see my note below.] : Please see my note below. [Consult Closing:] : Thank you very much for allowing me to participate in the care of this patient.  If you have any questions, please do not hesitate to contact me. [Sincerely,] : Sincerely, [FreeTextEntry2] : Dr. Beni Serrato\par [address] [facility] [address], \[place] [address]\par  [FreeTextEntry3] : Dev

## 2021-03-23 NOTE — HISTORY OF PRESENT ILLNESS
[de-identified] : 80 year old female referred by Dr. Oneill, initial visit for growth on tongue, s/p biopsy about 1 month ago with Dr. Peña, results inconclusive, prescribed Nystatin and Fluconazole, taken with no relief.  States lesions present for the past month, no increase in size.  Seen by Dental, s/p tooth extraction, currently taking oral Amoxicillin.  Denies pain, discharge, drainage and bleeding.  Reports no issues eating/drinking, history of reflux, taking Omeprazole as prescribed, former smoker.  Denies recent fevers and throat infections. \par

## 2021-03-23 NOTE — PROCEDURE
[FreeTextEntry1] : Biopsy oral lesion [FreeTextEntry2] : Oral leukoplakia [FreeTextEntry3] : After patient gave consent, the area of concern was anesthetized with 1% Lidocaine with 1:100K epinephrine.  A punch biopsy was performed through the area of greatest concern and sent to Pathology.  The wound was repaired with a suture of chromic.  Patient tolerated the procedure well. [Gag Reflex] : gag reflex preventing mirror examination [None] : none [Flexible Endoscope] : examined with the flexible endoscope [Serial Number: ___] : Serial Number: [unfilled] [de-identified] : No lesions in the NPx, OPx, HPx or larynx.  VC are mobile, airway patent.\par

## 2021-03-23 NOTE — REASON FOR VISIT
[Initial Consultation] : an initial consultation for [Other: _____] : [unfilled] [FreeTextEntry2] : referred by Dr. Oneill, initial visit for growth on tongue

## 2021-03-30 DIAGNOSIS — K14.8 OTHER DISEASES OF TONGUE: ICD-10-CM

## 2021-04-27 ENCOUNTER — APPOINTMENT (OUTPATIENT)
Dept: OTOLARYNGOLOGY | Facility: CLINIC | Age: 81
End: 2021-04-27
Payer: MEDICARE

## 2021-04-27 DIAGNOSIS — K14.8 OTHER DISEASES OF TONGUE: ICD-10-CM

## 2021-04-27 PROCEDURE — 99072 ADDL SUPL MATRL&STAF TM PHE: CPT

## 2021-04-27 PROCEDURE — 99213 OFFICE O/P EST LOW 20 MIN: CPT

## 2021-04-27 NOTE — HISTORY OF PRESENT ILLNESS
[de-identified] : 81 year old female referred by Dr. Oneill, initial visit for growth on tongue, s/p biopsy about 1 month ago with Dr. Peña, results inconclusive, prescribed Nystatin and Fluconazole, taken with no relief.  States lesions present for the past month, no increase in size. we did  tongue bx on 3/23/2021 which is benign. pt is here to f.u . pt is feeling better, healing well.  Denies pain, discharge, drainage and bleeding.  Reports no issues eating/drinking, history of reflux, taking Omeprazole as prescribed, former smoker.  Denies recent fevers and throat infections. \par

## 2021-04-27 NOTE — PHYSICAL EXAM
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 2 x 0.5 cm.  Biopsy site is well healed.  No TTP, no ulceration.  There is a very small area of leukoplakic changes, approx. 2-3mm along the tip of the tongue.  No other lesions.

## 2021-04-27 NOTE — REASON FOR VISIT
[Subsequent Evaluation] : a subsequent evaluation for [Other: _____] : [unfilled] [FreeTextEntry2] : f/u on  growth on tongue

## 2021-06-02 ENCOUNTER — APPOINTMENT (OUTPATIENT)
Dept: ORTHOPEDIC SURGERY | Facility: CLINIC | Age: 81
End: 2021-06-02
Payer: MEDICARE

## 2021-06-02 VITALS
BODY MASS INDEX: 34.95 KG/M2 | SYSTOLIC BLOOD PRESSURE: 127 MMHG | HEART RATE: 62 BPM | HEIGHT: 60 IN | DIASTOLIC BLOOD PRESSURE: 69 MMHG | WEIGHT: 178 LBS

## 2021-06-02 PROCEDURE — 73564 X-RAY EXAM KNEE 4 OR MORE: CPT | Mod: LT

## 2021-06-02 PROCEDURE — 99215 OFFICE O/P EST HI 40 MIN: CPT | Mod: 25

## 2021-06-02 PROCEDURE — 20610 DRAIN/INJ JOINT/BURSA W/O US: CPT | Mod: RT

## 2021-06-02 RX ORDER — LEVOTHYROXINE SODIUM 0.1 MG/1
100 TABLET ORAL
Qty: 90 | Refills: 0 | Status: ACTIVE | COMMUNITY
Start: 2020-12-16

## 2021-06-02 RX ORDER — ZOLPIDEM TARTRATE 10 MG/1
10 TABLET ORAL
Qty: 30 | Refills: 0 | Status: ACTIVE | COMMUNITY
Start: 2020-12-07

## 2021-06-02 RX ORDER — ALBUTEROL SULFATE 90 UG/1
108 (90 BASE) INHALANT RESPIRATORY (INHALATION)
Qty: 18 | Refills: 0 | Status: ACTIVE | COMMUNITY
Start: 2021-02-05

## 2021-06-02 RX ORDER — AMLODIPINE BESYLATE 5 MG/1
5 TABLET ORAL
Qty: 90 | Refills: 0 | Status: ACTIVE | COMMUNITY
Start: 2021-03-25

## 2021-06-02 RX ORDER — FLUCONAZOLE 100 MG/1
100 TABLET ORAL
Qty: 7 | Refills: 0 | Status: ACTIVE | COMMUNITY
Start: 2021-03-08

## 2021-06-02 RX ORDER — AMOXICILLIN 500 MG/1
500 CAPSULE ORAL
Qty: 21 | Refills: 0 | Status: ACTIVE | COMMUNITY
Start: 2021-03-20

## 2021-06-02 RX ORDER — NYSTATIN 100000 [USP'U]/ML
100000 SUSPENSION ORAL
Qty: 120 | Refills: 0 | Status: ACTIVE | COMMUNITY
Start: 2021-03-03

## 2021-06-02 RX ORDER — TRAMADOL HYDROCHLORIDE 50 MG/1
50 TABLET, COATED ORAL
Qty: 30 | Refills: 0 | Status: ACTIVE | COMMUNITY
Start: 2020-12-28

## 2021-06-02 RX ORDER — ROSUVASTATIN CALCIUM 40 MG/1
40 TABLET, FILM COATED ORAL
Qty: 90 | Refills: 0 | Status: ACTIVE | COMMUNITY
Start: 2020-10-27

## 2021-06-02 RX ORDER — IBUPROFEN 600 MG/1
600 TABLET, FILM COATED ORAL
Qty: 20 | Refills: 0 | Status: ACTIVE | COMMUNITY
Start: 2021-03-20

## 2021-06-05 NOTE — REASON FOR VISIT
[Initial Visit] : an initial visit for [Knee Pain] : knee pain [FreeTextEntry2] : Bilateral Knee Pain

## 2021-06-05 NOTE — PHYSICAL EXAM
[de-identified] : Patient is well nourished, well-developed, in no acute distress, with appropriate mood and affect. The patient is oriented to time, place, and person. Respirations are even and unlabored. Gait evaluation does reveal a limp. There is no inguinal adenopathy. Bilateral limbs are well-perfused, without skin lesions, shows a grossly normal motor and sensory examination. The right knee motion is significantly reduced and does cause significant pain. The right knee moves from 0 to 125 degrees. The knee is stable within that range-of-motion to AP and ML stress. The alignment of the knee is 5 degrees varus. Muscle strength is normal. Pedal pulses are palpable. Hip examination was negative. The left knee motion is significantly reduced and does cause significant pain. The left knee moves from 0 to 125 degrees. The knee is stable within that range-of-motion to AP and ML stress. The alignment of the knee is 5 degrees of valgus. Muscle strength is normal. Pedal pulses are palpable. Hip examination was negative. [de-identified] : Long standing knee, AP knee, lateral knee, and patellar views of the bilateral knee were ordered and taken in the office and demonstrate degenerative joint disease of the knee with joint space narrowing, osteophyte formation, and subchondral sclerosis.

## 2021-06-05 NOTE — DISCUSSION/SUMMARY
[de-identified] : This patient has bilateral knee osteoarthritis.  The patient is not an appropriate candidate for surgical intervention at this time. An extensive discussion was conducted on the natural history of the disease and the variety of surgical and non-surgical options available to the patient including, but not limited to non-steroidal anti-inflammatory medications, steroid injections, physical therapy, maintenance of ideal body weight, and reduction of activity.  Surgery is not strongly recommended in this patient given the fact that she is extraordinarily high risk for surgery given her extensive cardiac and pulmonary history.  I suggested nonoperative management.  She is suggested to use a cane or walker because she has instability while she ambulates.  Physical therapy for gait training is recommended.  Today we performed bilateral knee intra-articular cortisone injections.  Risks and benefits of surgery were discussed including extensive risks of myocardial infarction, delirium, CVA and death.\par The patient will schedule an appointment as needed.\par \par Informed consent for bilateral knee injections was obtained. All questions were answered. A time out was performed. The bilateral knees were prepped and draped in sterile fashion. Using sterile technique, the bilateral knees were each injected with 1cc of Kenalog, 4cc of 1% lidocaine, 4cc of 0.25% marcaine using a 21-gauge needle. A sterile dressing was applied. Post injection instructions were reviewed. The patient tolerated the procedure well.\par \par

## 2021-06-05 NOTE — HISTORY OF PRESENT ILLNESS
[de-identified] : This is very nice 81-year-old female experiencing bilateral knee pain, which is severe in intensity. The pain substantially limits activities of daily living. Walking tolerance is reduced.  She complains of giving way in the bilateral knees.  Difficulty walking.  Losing balance.  Previously saw Dr. Salvador and now care is transferred to me.  I reviewed Dr. Salvador's notes for the purpose of today's visit.  Medication and activity modification have been minimally effective for a period lasting greater than three months in duration. Assistive devices and external support were not deemed by the patient to be helpful in improving their function. Due to the severity of osteoarthritis and level of pain, physical therapy is contraindicated. Pain and restriction of function are intolerable at this time. The patient denies any radiation of the pain to the feet and it is not associated with numbness, tingling, or weakness.  Previously tried hyaluronic acid injections, last in 2019 which did help at that time.  Tylenol and Advil provide short lasting relief.  She has an extensive cardiac history.

## 2021-08-05 ENCOUNTER — APPOINTMENT (OUTPATIENT)
Dept: ORTHOPEDIC SURGERY | Facility: CLINIC | Age: 81
End: 2021-08-05
Payer: MEDICARE

## 2021-08-12 ENCOUNTER — APPOINTMENT (OUTPATIENT)
Dept: ORTHOPEDIC SURGERY | Facility: CLINIC | Age: 81
End: 2021-08-12
Payer: MEDICARE

## 2021-08-12 VITALS — SYSTOLIC BLOOD PRESSURE: 148 MMHG | HEART RATE: 73 BPM | DIASTOLIC BLOOD PRESSURE: 72 MMHG

## 2021-08-12 PROCEDURE — 72040 X-RAY EXAM NECK SPINE 2-3 VW: CPT

## 2021-08-12 PROCEDURE — 99213 OFFICE O/P EST LOW 20 MIN: CPT

## 2021-08-12 NOTE — DISCUSSION/SUMMARY
[de-identified] : Discussed findings of today's exam and possible causes of patient's pain.  Educated patient on their most probable diagnosis of right cervical radiculopathy.  Reviewed possible courses of treatment, and we collaboratively decided best course of treatment at this time will include conservative management.  Patient was started on gabapentin by her PCP, she states this is providing good temporary relief thus far, recommend continuation of this medication.  At this time recommend advanced imaging with MRI to evaluate for right-sided disc herniation.  Patient will follow up when MRI results are available.  Patient will likely benefit from physiatry consultation for AGATA.  Patient appreciates and agrees with current plan.\par \par This note was generated using dragon medical dictation software.  A reasonable effort has been made for proofreading its contents, but typos may still remain.  If there are any questions or points of clarification needed please notify my office.

## 2021-08-12 NOTE — PHYSICAL EXAM
[de-identified] : Constitutional: Well-nourished, well-developed, No acute distress\par Respiratory:  Good respiratory effort, no SOB\par Lymphatic: No regional lymphadenopathy, no lymphedema\par Psychiatric: Pleasant and normal affect, alert and oriented x3\par Skin: Clean dry and intact B/L UE/LE\par Musculoskeletal: normal except where as noted in regional exam\par \par \par Cervical Spine Exam\par APPEARANCE: no marked deformities or malalignment, normal curvature, good posture\par POSITIVE TENDERNESS: + Right upper trapezius, levator scapula, rhomboid major, and rhomboid minor, + spasm noted in the same muscles.\par NONTENDER: no bony midline tenderness.\par ROM: limited in all planes, most notably in flexion and sidebending due to pain\par RESISTIVE TESTING: painful 4/5 resisted ext, right sidebending, rotation and shoulder shrug , 5/5 flexion \par SPECIAL TESTS: + Right Spurling's\par Vasc: 2+ radial pulse b/l\par Neuro: C5 - T1 intact to motor, DTRs 2+/4 biceps, triceps, brachioradialis\par Sensation: Decreased sensation of the thumb, index and middle fingers of the right hand, otherwise intact to light touch throughout b/l UE\par \par Thoracic spine:  normal curvature and normal alignment. good posture. no midline bony tenderness, no marked spasm. no marked tenderness in paraspinal muscles.  ROM full & painless all planes\par \par  [de-identified] : The following radiographs were ordered and read by me during this patient's visit. I reviewed each radiograph in detail with the patient and discussed the findings as highlighted below. \par \par 2 views of the cervical spine were obtained today that show degenerative changes and evidence of severe osteoarthritis in the C4-C6 levels.  No fracture, or dislocation seen at this time. There is no malalignment. No other obvious osseous abnormality. Otherwise unremarkable.

## 2021-08-12 NOTE — HISTORY OF PRESENT ILLNESS
[de-identified] : 80 yo female presents for right shoulder pain follow up. She was last seen 1/07/21 for the diagnosis of subacromial impingement.  She received steroid injection which continues to provide relief of pain.  She is experiencing new numbness and tingling down anterior arm, which was not present on January exam.  She is taking gabapentin, tramadol as needed as prescribed by her PCP.  Advil works better for her pain.  She also reports pain about anterior shoulder with radiation down the arm, most pronounced at night.  She has difficulty with overhead activity.

## 2021-09-22 ENCOUNTER — APPOINTMENT (OUTPATIENT)
Dept: PHYSICAL MEDICINE AND REHAB | Facility: CLINIC | Age: 81
End: 2021-09-22
Payer: MEDICARE

## 2021-09-22 VITALS
HEART RATE: 68 BPM | DIASTOLIC BLOOD PRESSURE: 73 MMHG | TEMPERATURE: 96.1 F | OXYGEN SATURATION: 98 % | SYSTOLIC BLOOD PRESSURE: 149 MMHG

## 2021-09-22 DIAGNOSIS — M54.2 CERVICALGIA: ICD-10-CM

## 2021-09-22 DIAGNOSIS — M54.12 RADICULOPATHY, CERVICAL REGION: ICD-10-CM

## 2021-09-22 PROCEDURE — 99204 OFFICE O/P NEW MOD 45 MIN: CPT

## 2021-09-22 RX ORDER — MELOXICAM 7.5 MG/1
7.5 TABLET ORAL
Qty: 30 | Refills: 0 | Status: ACTIVE | COMMUNITY
Start: 2021-09-22 | End: 1900-01-01

## 2021-09-25 PROBLEM — M54.12 RIGHT CERVICAL RADICULOPATHY: Status: ACTIVE | Noted: 2021-08-12

## 2021-09-25 PROBLEM — M54.2 CERVICALGIA: Status: ACTIVE | Noted: 2021-09-25

## 2021-09-25 NOTE — HISTORY OF PRESENT ILLNESS
[FreeTextEntry1] : 80 yo F with PMH CAD s/p coronary stents, HTN, HLD who presents with neck pain.\par \par Onset: 2-3 months ago.  No inciting events, trauma, or falls.\par Location: lower cervical spine\par Characteristics: sharp \par Aggravating factors: neck movements (lateral rotation)\par Alleviating factors: rest\par Radiation: right upper extremity to the right hand\par Treatments: tylenol, NSAIDs, rest, physical therapy, HEP with significant improvement in her pain.\par Severity: 0/10 on this visit.  Patient will intermittently have paresthesia in the forearm and hand which she rates as 2/10 and lasts for seconds to minutes before resolving.\par \par Diagnostic studies:\par MRI cervical spine showing absent lordosis, multilevel degenerative changes worse at C3-C4 and C7-T1 with bilateral foraminal narrowing.\par No recent EMG/NCS\par \par Patient denies new weakness, numbness or paresthesia.  Denies bowel/bladder dysfunction, saddle anesthesia, fevers, chills, weight loss, night pain, or night sweats.\par \par

## 2021-09-25 NOTE — PHYSICAL EXAM
[FreeTextEntry1] : Gen: NAD\par Neck: FAROM, mild tenderness to lower cervical paraspinals, neg facet loading, neg spurling\par CV: no cyanosis\par Pulm: breathing well on room air\par Abd: soft\par Msk: \par 5/5 hip flexion B/L, 5/5 knee extension B/L, 5/5 knee flexion B/L, 5/5 dorsiflexion B/L, 5/5 EHL B/L, 5/5 plantar flexion B/L\par 5/5 shoulder abduction B/L, 5/5 elbow flexion B/L, 5/5 elbow extension B/L, 5/5 wrist extension B/L, 5/5 hand  B/L\par Neuro: sensation intact to light touch in bilateral upper and lower extremities, reflexes 2+ brachioradialis, biceps, triceps bilaterally, reflexes 2+ patella, medial hamstring, achilles bilaterally, negative babinski, negative perry\par

## 2021-09-25 NOTE — ASSESSMENT
[FreeTextEntry1] : 82 yo F who presents with neck pain with radiation into the right upper extremity consistent with cervical radiculopathy secondary to cervical spinal stenosis.\par \par I had an extended discussion with Ms. Hahn on this visit.  Various topics were covered including diagnosis, treatment options, and prognosis.  Relevant anatomy and treatment rationale reviewed.  Anticipatory guidance provided including instructions on how to manage future flares were discussed.  Lastly, red flag signs and symptoms were reviewed and patient instructed to seek immediate medical attention should these arise.\par \par Patient reports that pain and radicular symptoms have almost fully resolved.\par \par -Most recent Orthopedic notes reviewed\par -MRI cervical spine w/o contrast reviewed\par -Start mobic 7.5 mg PO qdaily x 30 days prn pain, recommend to take with food.  Denies CKD, CAD, or gastritis.  Recommend that if patient develops GI symptoms including abdominal pain, nausea, or vomiting to discontinue use of medication immediately.\par -Cont. PT/HEP, new referral provided\par -RTC prn pain\par \par Luis Miguel Fuentes MD\par Spine and Sports Medicine\par \par [facility] [employer] [facility] [employer]\par At [facility]\par \par

## 2021-11-23 ENCOUNTER — APPOINTMENT (OUTPATIENT)
Dept: OTOLARYNGOLOGY | Facility: CLINIC | Age: 81
End: 2021-11-23

## 2022-01-11 ENCOUNTER — TRANSCRIPTION ENCOUNTER (OUTPATIENT)
Age: 82
End: 2022-01-11

## 2022-01-18 ENCOUNTER — APPOINTMENT (OUTPATIENT)
Dept: PULMONOLOGY | Facility: CLINIC | Age: 82
End: 2022-01-18
Payer: MEDICARE

## 2022-01-18 VITALS
TEMPERATURE: 98.9 F | BODY MASS INDEX: 34.76 KG/M2 | HEART RATE: 76 BPM | OXYGEN SATURATION: 91 % | WEIGHT: 178 LBS | SYSTOLIC BLOOD PRESSURE: 146 MMHG | DIASTOLIC BLOOD PRESSURE: 72 MMHG

## 2022-01-18 DIAGNOSIS — J00 ACUTE NASOPHARYNGITIS [COMMON COLD]: ICD-10-CM

## 2022-01-18 DIAGNOSIS — J98.4 OTHER DISORDERS OF LUNG: ICD-10-CM

## 2022-01-18 PROCEDURE — 94729 DIFFUSING CAPACITY: CPT

## 2022-01-18 PROCEDURE — 99214 OFFICE O/P EST MOD 30 MIN: CPT | Mod: 25

## 2022-01-18 PROCEDURE — 94060 EVALUATION OF WHEEZING: CPT

## 2022-01-18 PROCEDURE — 94727 GAS DIL/WSHOT DETER LNG VOL: CPT

## 2022-01-18 RX ORDER — PREDNISONE 20 MG/1
20 TABLET ORAL
Qty: 10 | Refills: 0 | Status: ACTIVE | COMMUNITY
Start: 2022-01-18 | End: 1900-01-01

## 2022-01-30 PROBLEM — J00 ACUTE RHINITIS: Status: ACTIVE | Noted: 2022-01-30

## 2022-01-30 PROBLEM — J98.4 RESTRICTIVE AIRWAY DISEASE: Status: ACTIVE | Noted: 2022-01-30

## 2022-01-30 NOTE — DISCUSSION/SUMMARY
[FreeTextEntry1] : 80 yo female with OAD related complaints. I reviewed the PFT results with the patient, which revealed decrease in lung volumes with normal diffusion however, thus one must believe that the abnormality is effort related. Prednisone 40 mg daily for five days prescribed with nasal saline PRN. She is to continue advair and albuterol MDI as before. Montelukast will be restarted if symptoms do not resolve. She is to follow up with her PMD as before.

## 2022-01-30 NOTE — HISTORY OF PRESENT ILLNESS
[Former] : former [< 30 pack-years] : < 30 pack-years [TextBox_4] : 82 yo female with hx of "asthma" presents for follow up. The patient was last seen by me June 2018 treated with advair, montelukast and albuterol MDI. Presently she uses advair once daily with albuterol MDI, having stopped montelukast "a while ago". She complains of post nasal drip with "congestion" for one month for which she uses benadryl PRN.She was seen in a walk in clinic one week ago, treated with augmentin. She was covid 19 swab negative.  [YearQuit] : 2000 [TextBox_29] : Denies snoring, daytime somnolence, apneic episodes, AM headaches

## 2022-01-30 NOTE — REVIEW OF SYSTEMS
[Nasal Congestion] : nasal congestion [Postnasal Drip] : postnasal drip [Cough] : cough [Sputum] : sputum [Negative] : Endocrine

## 2022-04-05 ENCOUNTER — APPOINTMENT (OUTPATIENT)
Dept: ORTHOPEDIC SURGERY | Facility: CLINIC | Age: 82
End: 2022-04-05
Payer: MEDICARE

## 2022-04-05 VITALS — BODY MASS INDEX: 35.34 KG/M2 | HEIGHT: 60 IN | WEIGHT: 180 LBS

## 2022-04-05 DIAGNOSIS — M17.11 UNILATERAL PRIMARY OSTEOARTHRITIS, RIGHT KNEE: ICD-10-CM

## 2022-04-05 PROCEDURE — 99213 OFFICE O/P EST LOW 20 MIN: CPT

## 2022-04-05 PROCEDURE — 73564 X-RAY EXAM KNEE 4 OR MORE: CPT | Mod: RT

## 2022-04-05 NOTE — PHYSICAL EXAM
[de-identified] : Constitutional - the patient is of normal build and overweight with a BMI of 35..  The patient remains oriented to person, time, and  place.  Mood is normal. Vital signs as recorded.  The patients gait is with a limp and pain off her left knee. The patient has satisfactory  balance and can stand on toes and heels.\par \par The patient has no difficulty with respiration.  She does have asthma.  Respiration at rest is a normal rate. The patient is not short of breath and has not become short of breath with short ambulation. There is no audible wheezing. No coughing.\par \par Skin is normal for the patient's age. There are no abnormal masses or lymph nodes which stand out in the lower extremities.\par \par Spine - deep tendon reflexes are symmetric. Motor and sensory are symmetric.\par \par \par UPPER EXTREMITIES \par \par Shoulders ROM  is symmetric  and the motion is satisfactory.  There is no significant shoulder pain or limitation in motion which would make using a cane or a walker difficult. Shoulder stability and  strength are satisfactory.\par \par There is normal motion in the wrists and elbows.\par \par Circulation appears satisfactory with pedal pulses present.  There is no major edema in the lower legs. No skin tenderness or increased temperature. No major varicosities.\par \par HIP EXAMINATION the abduction and abduction as well as rotation measurements were taken with the hip in flexion.\par \par Motion\par There is symmetric motion with flexion 135 degrees, abduction 80 degrees, adduction 30 degrees, external rotation 80 degrees, internal rotation 20 degrees.\par \par The hips have good range of motion. There is good strength across the hips. There is no crepitus in either hip. The alignment of the hips is normal.\par \par \par KNEE EXAMINATION\par \par Motion\par Right Knee has 0 to 130 degrees of motion with good medial  lateral and anterior posterior stability.  There is no major effusion.  There is no Baker's cyst.  There is patellofemoral crepitus.  She has pain directly over medial joint line but the Steinmann test is negative.  The patient has satisfactory strength across the knee.               \par Left  Knee   has 0 to 120 degrees of motion with good medial lateral and anterior posterior stability.  She does have slight valgus alignment to her knee.  There is no major effusion there is no Baker's cyst.  There is  patellofemoral crepitus.  She has pain with compression of the patellofemoral joint.  She has pain over her lateral joint line with any palpation.  She guards the strength in this knee.  \par \par          \par \par Ankle and foot examination\par Of the ankle has normal motion.  There is normal ankle stability.  The patient has no major abnormalities of the foot.\par \par \par \par  [de-identified] : 4 views were done of both the right and left knees the right knee does show narrowing of the medial compartment of the right knee with a bone against bone contact seen best on the Torres view her left knee shows again arthritis but on the lateral side with significant narrowing of the lateral compartment of the knee and on the Torres view bone against bone contact laterally.  The lateral view of her knees also shows the appropriate compartments with bone against bone contact she has bilateral peripheral osteophytes off the patella and patellofemoral arthritis she has a slight valgus attitude to her left knee.  Impression severe osteoarthritis knees left knee lateral patellofemoral right knee medial and patellofemoral.

## 2022-04-05 NOTE — HISTORY OF PRESENT ILLNESS
[de-identified] : This is very nice 82-year-old female experiencing bilateral knee pain, which is severe in intensity. The pain substantially limits activities of daily living. Walking tolerance is reduced. She complains of giving way in the bilateral knees. Difficulty walking. Losing balance. Previously saw Dr. Salvador and Dr. Clark. Medication and activity modification have been minimally effective for a period lasting greater than three months in duration. Assistive devices and external support were not deemed by the patient to be helpful in improving their function. Due to the severity of osteoarthritis and level of pain, physical therapy is contraindicated. Pain and restriction of function are intolerable at this time. The patient denies any radiation of the pain to the feet and it is not associated with numbness, tingling, or weakness. Previously tried hyaluronic acid injections, last in 2019 which did help at that time. Cortisone injections in June 2021 only helped temporarily.  Medical history is pertinent that she has a history of asthma and has had an aortic valve replacement which was a bovine valve 10 years ago.  She is only on the aspirin.  Tylenol and Advil provide short lasting relief. She has an extensive cardiac history.

## 2022-04-05 NOTE — DISCUSSION/SUMMARY
[de-identified] : Long discussion was had with the patient about her medical risk has been told that she would be in satisfactory condition for a total knee replacement.  She remains very active for her age.  She will discuss it further with her medical doctor and cardiologist.  As far as her knee she has heard about the Adi assisted total knee replacements.  She is seen it on the news and advertised and would like to consider left total knee replacement with the Adi assisted system.  A long discussion was had with the patient about total knee replacements as well as the hospitalization and the rehabilitation.  The surgical risk and benefits were discussed.  The natural history and treatment of degenerative arthritis was discussed with the patient at length today.  The spectrum of the treatment including nonoperative modalities to surgical intervention was elucidated.  Noninvasive and nonoperative treatment modalities include weight reduction, activity modification with low impact exercise, needed use of Tylenol or anti-inflammatory medications if tolerated, glucosamine and chondroitin supplement, and physical therapy.  In some cases the further treatment can include corticosteroid injections and the use of Visco supplementation with hyaluronic acid injections.  Definite surgical treatment can certainly include total joint arthroplasty also.  The risk and benefits of each treatment option was discussed and questions were answered.\par \par  A  long discussion was had with the patient as what the total joint replacement would entail.  A model was used as an educational tool to demonstrate the operation.    We did discuss implant choice and fixation, cemented or porous ingrowth, and stability concerns.  Shared decision making was made with the patient. The hospitalization and rehabilitation were discussed.  The uses of perioperative antibiotics and DVT prophylaxis were discussed.   The risks, benefits and alternatives to surgical intervention were discussed at length with the patient. Specific risks discussed included: infection, wound breakdown, numbness and damage to nerves, tendon, muscle, arteries or other blood vessels.  The possibility of recurrent pain, no improvement in pain and actual worsening of the pain were also mentioned in conversation with the patient. Medical complications related to the patient's general medical health including deep vein thrombosis, pulmonary embolus, heart attack, stroke, death and other complications from anesthesia were addressed. The patient was told that we will take steps to minimize these risks by using sterile technique, antibiotics and DVT prophylaxis when appropriate and following the patient postoperatively. The benefits of surgery were discussed with the patient including the potential to improve the current clinical condition throughout operative intervention. Alternatives to surgical intervention include continued conservative management which may yield less than optimal results this particular patient. Questions were answered to the satisfaction of the patient.\par \par In this individual the additional risk factors due to their medical conditions were discussed.\par Schedule a left Adi assisted total knee replacement.

## 2022-04-12 ENCOUNTER — APPOINTMENT (OUTPATIENT)
Dept: PULMONOLOGY | Facility: CLINIC | Age: 82
End: 2022-04-12

## 2022-04-21 ENCOUNTER — APPOINTMENT (OUTPATIENT)
Dept: ORTHOPEDIC SURGERY | Facility: CLINIC | Age: 82
End: 2022-04-21
Payer: MEDICARE

## 2022-04-21 VITALS — BODY MASS INDEX: 35.34 KG/M2 | HEIGHT: 60 IN | WEIGHT: 180 LBS

## 2022-04-21 DIAGNOSIS — M17.12 UNILATERAL PRIMARY OSTEOARTHRITIS, LEFT KNEE: ICD-10-CM

## 2022-04-21 DIAGNOSIS — M17.0 BILATERAL PRIMARY OSTEOARTHRITIS OF KNEE: ICD-10-CM

## 2022-04-21 PROCEDURE — 99215 OFFICE O/P EST HI 40 MIN: CPT

## 2022-04-22 PROBLEM — M17.12 PRIMARY OSTEOARTHRITIS OF LEFT KNEE: Status: ACTIVE | Noted: 2019-09-19

## 2022-05-10 ENCOUNTER — APPOINTMENT (OUTPATIENT)
Dept: PULMONOLOGY | Facility: CLINIC | Age: 82
End: 2022-05-10
Payer: MEDICARE

## 2022-05-10 VITALS
HEART RATE: 82 BPM | OXYGEN SATURATION: 92 % | SYSTOLIC BLOOD PRESSURE: 144 MMHG | TEMPERATURE: 97.3 F | DIASTOLIC BLOOD PRESSURE: 60 MMHG | BODY MASS INDEX: 34.37 KG/M2 | WEIGHT: 176 LBS

## 2022-05-10 DIAGNOSIS — J31.0 CHRONIC RHINITIS: ICD-10-CM

## 2022-05-10 DIAGNOSIS — J44.9 CHRONIC OBSTRUCTIVE PULMONARY DISEASE, UNSPECIFIED: ICD-10-CM

## 2022-05-10 PROCEDURE — 99214 OFFICE O/P EST MOD 30 MIN: CPT

## 2022-05-10 NOTE — HISTORY OF PRESENT ILLNESS
[TextBox_4] : 83 yo female with hx of OAD presents for follow up. The patient complains of PRN nocturnal nasal congestion with SOB. She denies cough or chest pain. She uses advair 250/50 once daily with albuterol MDI and PRN benadryl.

## 2022-05-10 NOTE — DISCUSSION/SUMMARY
[FreeTextEntry1] : 83 yo female with OAD related complaints with rhinitis. Montelukast daily prescribed with continued advair and albuterol as before. Treatment adjustment will depend on symptomatic needs.Use of second generation antihistamine recommended to avoid sedative effect of benadryl. PFT will be performed in the future. She is to follow up with her PMD as before.

## 2022-05-16 ENCOUNTER — APPOINTMENT (OUTPATIENT)
Dept: CT IMAGING | Facility: CLINIC | Age: 82
End: 2022-05-16
Payer: MEDICARE

## 2022-05-16 ENCOUNTER — OUTPATIENT (OUTPATIENT)
Dept: OUTPATIENT SERVICES | Facility: HOSPITAL | Age: 82
LOS: 1 days | End: 2022-05-16
Payer: COMMERCIAL

## 2022-05-16 VITALS
TEMPERATURE: 98 F | SYSTOLIC BLOOD PRESSURE: 153 MMHG | RESPIRATION RATE: 14 BRPM | DIASTOLIC BLOOD PRESSURE: 68 MMHG | WEIGHT: 179.02 LBS | OXYGEN SATURATION: 100 % | HEIGHT: 60 IN | HEART RATE: 65 BPM

## 2022-05-16 DIAGNOSIS — Z00.00 ENCOUNTER FOR GENERAL ADULT MEDICAL EXAMINATION WITHOUT ABNORMAL FINDINGS: ICD-10-CM

## 2022-05-16 DIAGNOSIS — M17.12 UNILATERAL PRIMARY OSTEOARTHRITIS, LEFT KNEE: ICD-10-CM

## 2022-05-16 DIAGNOSIS — Z95.2 PRESENCE OF PROSTHETIC HEART VALVE: ICD-10-CM

## 2022-05-16 DIAGNOSIS — Z95.2 PRESENCE OF PROSTHETIC HEART VALVE: Chronic | ICD-10-CM

## 2022-05-16 DIAGNOSIS — Z01.818 ENCOUNTER FOR OTHER PREPROCEDURAL EXAMINATION: ICD-10-CM

## 2022-05-16 DIAGNOSIS — Z29.9 ENCOUNTER FOR PROPHYLACTIC MEASURES, UNSPECIFIED: ICD-10-CM

## 2022-05-16 LAB
A1C WITH ESTIMATED AVERAGE GLUCOSE RESULT: 5.7 % — HIGH (ref 4–5.6)
ANION GAP SERPL CALC-SCNC: 13 MMOL/L — SIGNIFICANT CHANGE UP (ref 5–17)
BLD GP AB SCN SERPL QL: NEGATIVE — SIGNIFICANT CHANGE UP
BUN SERPL-MCNC: 43 MG/DL — HIGH (ref 7–23)
CALCIUM SERPL-MCNC: 10.2 MG/DL — SIGNIFICANT CHANGE UP (ref 8.4–10.5)
CHLORIDE SERPL-SCNC: 105 MMOL/L — SIGNIFICANT CHANGE UP (ref 96–108)
CO2 SERPL-SCNC: 23 MMOL/L — SIGNIFICANT CHANGE UP (ref 22–31)
CREAT SERPL-MCNC: 1.5 MG/DL — HIGH (ref 0.5–1.3)
EGFR: 34 ML/MIN/1.73M2 — LOW
ESTIMATED AVERAGE GLUCOSE: 117 MG/DL — HIGH (ref 68–114)
GLUCOSE SERPL-MCNC: 106 MG/DL — HIGH (ref 70–99)
HCT VFR BLD CALC: 39.6 % — SIGNIFICANT CHANGE UP (ref 34.5–45)
HGB BLD-MCNC: 12.7 G/DL — SIGNIFICANT CHANGE UP (ref 11.5–15.5)
MCHC RBC-ENTMCNC: 30.5 PG — SIGNIFICANT CHANGE UP (ref 27–34)
MCHC RBC-ENTMCNC: 32.1 GM/DL — SIGNIFICANT CHANGE UP (ref 32–36)
MCV RBC AUTO: 95.2 FL — SIGNIFICANT CHANGE UP (ref 80–100)
MRSA PCR RESULT.: SIGNIFICANT CHANGE UP
NRBC # BLD: 0 /100 WBCS — SIGNIFICANT CHANGE UP (ref 0–0)
PLATELET # BLD AUTO: 186 K/UL — SIGNIFICANT CHANGE UP (ref 150–400)
POTASSIUM SERPL-MCNC: 4.1 MMOL/L — SIGNIFICANT CHANGE UP (ref 3.5–5.3)
POTASSIUM SERPL-SCNC: 4.1 MMOL/L — SIGNIFICANT CHANGE UP (ref 3.5–5.3)
RBC # BLD: 4.16 M/UL — SIGNIFICANT CHANGE UP (ref 3.8–5.2)
RBC # FLD: 12.5 % — SIGNIFICANT CHANGE UP (ref 10.3–14.5)
RH IG SCN BLD-IMP: NEGATIVE — SIGNIFICANT CHANGE UP
S AUREUS DNA NOSE QL NAA+PROBE: SIGNIFICANT CHANGE UP
SODIUM SERPL-SCNC: 141 MMOL/L — SIGNIFICANT CHANGE UP (ref 135–145)
WBC # BLD: 6.94 K/UL — SIGNIFICANT CHANGE UP (ref 3.8–10.5)
WBC # FLD AUTO: 6.94 K/UL — SIGNIFICANT CHANGE UP (ref 3.8–10.5)

## 2022-05-16 PROCEDURE — 80048 BASIC METABOLIC PNL TOTAL CA: CPT

## 2022-05-16 PROCEDURE — 85027 COMPLETE CBC AUTOMATED: CPT

## 2022-05-16 PROCEDURE — 73700 CT LOWER EXTREMITY W/O DYE: CPT | Mod: 26,LT

## 2022-05-16 PROCEDURE — 86900 BLOOD TYPING SEROLOGIC ABO: CPT

## 2022-05-16 PROCEDURE — 73700 CT LOWER EXTREMITY W/O DYE: CPT

## 2022-05-16 PROCEDURE — 86850 RBC ANTIBODY SCREEN: CPT

## 2022-05-16 PROCEDURE — 87641 MR-STAPH DNA AMP PROBE: CPT

## 2022-05-16 PROCEDURE — 83036 HEMOGLOBIN GLYCOSYLATED A1C: CPT

## 2022-05-16 PROCEDURE — G0463: CPT

## 2022-05-16 PROCEDURE — 87640 STAPH A DNA AMP PROBE: CPT

## 2022-05-16 PROCEDURE — 86901 BLOOD TYPING SEROLOGIC RH(D): CPT

## 2022-05-16 RX ORDER — PANTOPRAZOLE SODIUM 20 MG/1
40 TABLET, DELAYED RELEASE ORAL ONCE
Refills: 0 | Status: COMPLETED | OUTPATIENT
Start: 2022-06-06 | End: 2022-06-06

## 2022-05-16 RX ORDER — ALBUTEROL 90 UG/1
0 AEROSOL, METERED ORAL
Qty: 0 | Refills: 0 | DISCHARGE

## 2022-05-16 RX ORDER — CEFAZOLIN SODIUM 1 G
2000 VIAL (EA) INJECTION ONCE
Refills: 0 | Status: DISCONTINUED | OUTPATIENT
Start: 2022-06-06 | End: 2022-06-09

## 2022-05-16 RX ORDER — SODIUM CHLORIDE 9 MG/ML
3 INJECTION INTRAMUSCULAR; INTRAVENOUS; SUBCUTANEOUS EVERY 8 HOURS
Refills: 0 | Status: DISCONTINUED | OUTPATIENT
Start: 2022-06-06 | End: 2022-06-06

## 2022-05-16 RX ORDER — TRAMADOL HYDROCHLORIDE 50 MG/1
50 TABLET ORAL ONCE
Refills: 0 | Status: DISCONTINUED | OUTPATIENT
Start: 2022-06-06 | End: 2022-06-06

## 2022-05-16 RX ORDER — LOSARTAN/HYDROCHLOROTHIAZIDE 100MG-25MG
1 TABLET ORAL
Qty: 0 | Refills: 0 | DISCHARGE

## 2022-05-16 RX ORDER — CHLORHEXIDINE GLUCONATE 213 G/1000ML
1 SOLUTION TOPICAL ONCE
Refills: 0 | Status: DISCONTINUED | OUTPATIENT
Start: 2022-06-06 | End: 2022-06-09

## 2022-05-16 RX ORDER — LIDOCAINE HCL 20 MG/ML
0.2 VIAL (ML) INJECTION ONCE
Refills: 0 | Status: DISCONTINUED | OUTPATIENT
Start: 2022-06-06 | End: 2022-06-06

## 2022-05-16 RX ORDER — LEVOTHYROXINE SODIUM 125 MCG
1 TABLET ORAL
Qty: 0 | Refills: 0 | DISCHARGE

## 2022-05-16 NOTE — H&P PST ADULT - NSICDXPASTMEDICALHX_GEN_ALL_CORE_FT
PAST MEDICAL HISTORY:  Adult hypothyroidism Managed by PCP    Asthma On advair - Uses Albuterol Rescue Inhaler once at bedtime daily - Pt states Montelukast was added to her regimen last week with pulmonologist to reduce rescue inhaler use    Former smoker 0.1 PPD x 8 years; Quit over 30 years ago    GERD (gastroesophageal reflux disease)     H/O carotid artery stenosis Pt unsure of which side - B/L Carotid Doppler Study annually with cardiologist to monitor    HLD (hyperlipidemia)      (normal spontaneous vaginal delivery) x 2    Personal History of Hypertension     Severe aortic stenosis s/p AVR replacement with Bovine valve (2018)    Unilateral primary osteoarthritis, left knee Pending Left Total Knee Arthroplasty with CARLITOS Robot on 22 with Dr. Sanchez     PAST MEDICAL HISTORY:  Adult hypothyroidism Managed by PCP    Asthma On advair - Uses Albuterol Rescue Inhaler once at bedtime daily - Pt states Montelukast was added to her regimen last week with pulmonologist to reduce rescue inhaler use    Former smoker 0.1 PPD x 8 years; Quit over 30 years ago    GERD (gastroesophageal reflux disease)     H/O carotid artery stenosis Pt unsure of which side - B/L Carotid Doppler Study annually with cardiologist to monitor    HLD (hyperlipidemia)      (normal spontaneous vaginal delivery) x 2    Obesity BMI 35.0    Personal History of Hypertension     Severe aortic stenosis s/p AVR replacement with Bovine valve (2018)    Unilateral primary osteoarthritis, left knee Pending Left Total Knee Arthroplasty with CARLITOS Robot on 22 with Dr. Sanchez

## 2022-05-16 NOTE — H&P PST ADULT - NSANTHOSAYNRD_GEN_A_CORE
No. DEVIN screening performed.  STOP BANG Legend: 0-2 = LOW Risk; 3-4 = INTERMEDIATE Risk; 5-8 = HIGH Risk

## 2022-05-16 NOTE — H&P PST ADULT - ASSESSMENT
DUNIAI VTE 2.0 SCORE [CLOT updated 2019]    AGE RELATED RISK FACTORS                                                       MOBILITY RELATED FACTORS  [ ] Age 41-60 years                                            (1 Point)                    [ ] Bed rest                                                        (1 Point)  [ ] Age: 61-74 years                                           (2 Points)                  [ ] Plaster cast                                                   (2 Points)  [X ] Age= 75 years                                              (3 Points)                    [ ] Bed bound for more than 72 hours                 (2 Points)    DISEASE RELATED RISK FACTORS                                               GENDER SPECIFIC FACTORS  [ ] Edema in the lower extremities                       (1 Point)              [ ] Pregnancy                                                     (1 Point)  [ ] Varicose veins                                               (1 Point)                     [ ] Post-partum < 6 weeks                                   (1 Point)             [X ] BMI > 25 Kg/m2                                            (1 Point)                     [ ] Hormonal therapy  or oral contraception          (1 Point)                 [ ] Sepsis (in the previous month)                        (1 Point)               [ ] History of pregnancy complications                 (1 point)  [ X] Pneumonia or serious lung disease                                               [ ] Unexplained or recurrent                     (1 Point)           (in the previous month)                               (1 Point)  [ ] Abnormal pulmonary function test                     (1 Point)                 SURGERY RELATED RISK FACTORS  [ ] Acute myocardial infarction                              (1 Point)               [ ]  Section                                             (1 Point)  [ ] Congestive heart failure (in the previous month)  (1 Point)      [ ] Minor surgery                                                  (1 Point)   [ ] Inflammatory bowel disease                             (1 Point)               [ ] Arthroscopic surgery                                        (2 Points)  [ ] Central venous access                                      (2 Points)                [X ] General surgery lasting more than 45 minutes (2 points)  [ ] Malignancy- Present or previous                   (2 Points)                [ ] Elective arthroplasty                                         (5 points)    [ ] Stroke (in the previous month)                          (5 Points)                                                                                                                                                           HEMATOLOGY RELATED FACTORS                                                 TRAUMA RELATED RISK FACTORS  [ ] Prior episodes of VTE                                     (3 Points)                [ ] Fracture of the hip, pelvis, or leg                       (5 Points)  [ ] Positive family history for VTE                         (3 Points)             [ ] Acute spinal cord injury (in the previous month)  (5 Points)  [ ] Prothrombin 50085 A                                     (3 Points)               [ ] Paralysis  (less than 1 month)                             (5 Points)  [ ] Factor V Leiden                                             (3 Points)                  [ ] Multiple Trauma within 1 month                        (5 Points)  [ ] Lupus anticoagulants                                     (3 Points)                                                           [ ] Anticardiolipin antibodies                               (3 Points)                                                       [ ] High homocysteine in the blood                      (3 Points)                                             [ ] Other congenital or acquired thrombophilia      (3 Points)                                                [ ] Heparin induced thrombocytopenia                  (3 Points)                                     Total Score [     7     ]

## 2022-05-16 NOTE — H&P PST ADULT - PROBLEM SELECTOR PLAN 1
Pt is scheduled for a Left Total Knee Replacement with CARLITOS Robot on 6/6/22 with Dr. Laura Schulz PCR test scheduled for 6/3/22 at [facility]  CBC, BMP, HGA1C, T/S and MRSA/MSSA ordered and obtained at [facility]  ABO, FS on admit  ERP medications ordered DOS  Pt advised to drink Ensure protein drink 2 hours pre-operatively DOS with teach back understanding Pt is scheduled for a Left Total Knee Replacement with CARLITOS Robot on 6/6/22 with Dr. Laura Schulz PCR test scheduled for 6/3/22 at [facility]  CBC, BMP, HGA1C, T/S and MRSA/MSSA ordered and obtained at [facility] [place]  ABO, FS on admit  ERP medications ordered DOS  Pt advised to drink Ensure protein drink 2 hours pre-operatively DOS with teach back understanding  Left knee CT scan outpatient today as per patient

## 2022-05-16 NOTE — H&P PST ADULT - OTHER CARE PROVIDERS
Dr. Jonatan Grant (Cardiologist) [phone] - Cardiac Eval. on 5/27/22; Dr. Khang Luis (Pulmonologist) [phone] - Last visit 5/10/22 for Routine F/U (In chart)

## 2022-05-16 NOTE — H&P PST ADULT - REASON FOR ADMISSION
"I am having a left knee replacement."  Patient Goal: "I am having a left knee replacement."  Patient Goal: "To have less pain."

## 2022-05-16 NOTE — H&P PST ADULT - FALL HARM RISK - UNIVERSAL INTERVENTIONS
Bed in lowest position, wheels locked, appropriate side rails in place/Call bell, personal items and telephone in reach/Instruct patient to call for assistance before getting out of bed or chair/Non-slip footwear when patient is out of bed/[place] to call system/Physically safe environment - no spills, clutter or unnecessary equipment/Purposeful Proactive Rounding/Room/bathroom lighting operational, light cord in reach

## 2022-05-16 NOTE — H&P PST ADULT - NSICDXPASTSURGICALHX_GEN_ALL_CORE_FT
PAST SURGICAL HISTORY:  H/O aortic valve replacement Open heart surgery with Bovine Aortic Valve Replacement (2018) - on ASA 81 mg    S/P cataract surgery B/L with IOL Implant (over 20 years ago)    Tubal ligation status At age 17

## 2022-05-16 NOTE — H&P PST ADULT - HISTORY OF PRESENT ILLNESS
82 year old female with PMH HTN, Hypothyroidism, Carotid Artery Stenosis, Severe AS s/p AVR (Bovine Valve) (2012) and Asthma reports c/o intermittent, achy B/L knee pain, which is severe in intensity x 4 years. At rest, she rates her pain 3/10, at its worse, she rates the pain 7/10. The pain substantially limits ADLs. Walking tolerance is reduced. Medication and activity modification have been minimally effective for a period lasting greater than 3 months in duration. Due to the severity of OA and level of pain, PT is contraindicated. The patient denies any radiation of the pain to the feeet and is not a/q numbness, tingling or weakness. Previously has tried hyaluronic acid injections with last injection in 2019, which did not help at that time. Cortisone injections in 6/2021 only helped temporarily. Tylenol and advil provide short-lasting relief. Pt now presents to PST for scheduled Left Total Knee Arthroplasty with CARLITOS Robot on 6/6/22 with Dr. Sanchez.     Covid PCR test scheduled for 6/3/22 at [facility]  Covid vaccine Pfizer 2nd dose received 4/4/21; Pfizer booster received 12/27/21  No recent hospitalizations over the last 3 months  Denies recent travel, exposure or Covid symptoms  82 year old female with PMH Obesity (BMI 35.0), HTN, Hypothyroidism, Carotid Artery Stenosis, Severe AS s/p AVR (Bovine Valve) (2012) and Asthma reports c/o intermittent, achy B/L knee pain, which is severe in intensity x 4 years. At rest, she rates her pain 3/10, at its worse, she rates the pain 7/10. The pain substantially limits ADLs. Walking tolerance is reduced. Medication and activity modification have been minimally effective for a period lasting greater than 3 months in duration. Due to the severity of OA and level of pain, PT is contraindicated. The patient denies any radiation of the pain to the feeet and is not a/q numbness, tingling or weakness. Previously has tried hyaluronic acid injections with last injection in 2019, which did not help at that time. Cortisone injections in 6/2021 only helped temporarily. Tylenol and advil provide short-lasting relief. Pt now presents to PST for scheduled Left Total Knee Arthroplasty with CARLITOS Robot on 6/6/22 with Dr. Sanchez.     Covid PCR test scheduled for 6/3/22 at [facility]  Covid vaccine Pfizer 2nd dose received 4/4/21; Pfizer booster received 12/27/21  No recent hospitalizations over the last 3 months  Denies recent travel, exposure or Covid symptoms  82 year old female with PMH Obesity (BMI 35.0), HTN, Hypothyroidism, Carotid Artery Stenosis, Severe AS s/p AVR (Bovine Valve) (2012) and Asthma reports c/o intermittent, achy B/L knee pain, which is severe in intensity x 4 years. At rest, she rates her pain 3/10, at its worse, she rates the pain 7/10. The pain substantially limits ADLs. Walking tolerance is reduced. Medication and activity modification have been minimally effective for a period lasting greater than 3 months in duration. Due to the severity of OA and level of pain, PT is contraindicated. The patient denies any radiation of the pain to the feet and is not a/w numbness, tingling or weakness. Previously has tried hyaluronic acid injections with last injection in 2019, which did not help at that time. Cortisone injections in 6/2021 only helped temporarily. Tylenol and advil provide short-lasting relief. Pt now presents to PST for scheduled Left Total Knee Arthroplasty with CARLITOS Robot on 6/6/22 with Dr. Sanchez.     Covid PCR test scheduled for 6/3/22 at [facility]  Covid vaccine Pfizer 2nd dose received 4/4/21; Pfizer booster received 12/27/21  No recent hospitalizations over the last 3 months  Denies recent travel, exposure or Covid symptoms

## 2022-06-03 ENCOUNTER — OUTPATIENT (OUTPATIENT)
Dept: OUTPATIENT SERVICES | Facility: HOSPITAL | Age: 82
LOS: 1 days | End: 2022-06-03
Payer: COMMERCIAL

## 2022-06-03 DIAGNOSIS — Z95.2 PRESENCE OF PROSTHETIC HEART VALVE: Chronic | ICD-10-CM

## 2022-06-03 DIAGNOSIS — Z11.52 ENCOUNTER FOR SCREENING FOR COVID-19: ICD-10-CM

## 2022-06-03 LAB — SARS-COV-2 RNA SPEC QL NAA+PROBE: SIGNIFICANT CHANGE UP

## 2022-06-03 PROCEDURE — U0003: CPT

## 2022-06-03 PROCEDURE — U0005: CPT

## 2022-06-03 PROCEDURE — C9803: CPT

## 2022-06-05 ENCOUNTER — TRANSCRIPTION ENCOUNTER (OUTPATIENT)
Age: 82
End: 2022-06-05

## 2022-06-06 ENCOUNTER — APPOINTMENT (OUTPATIENT)
Dept: ORTHOPEDIC SURGERY | Facility: HOSPITAL | Age: 82
End: 2022-06-06

## 2022-06-06 ENCOUNTER — INPATIENT (INPATIENT)
Facility: HOSPITAL | Age: 82
LOS: 2 days | Discharge: NURSING, REHAB OR LONG-TERM CARE | DRG: 470 | End: 2022-06-09
Attending: ORTHOPAEDIC SURGERY | Admitting: ORTHOPAEDIC SURGERY
Payer: COMMERCIAL

## 2022-06-06 VITALS
WEIGHT: 179.02 LBS | SYSTOLIC BLOOD PRESSURE: 197 MMHG | HEIGHT: 60 IN | HEART RATE: 68 BPM | OXYGEN SATURATION: 98 % | RESPIRATION RATE: 18 BRPM | TEMPERATURE: 98 F | DIASTOLIC BLOOD PRESSURE: 66 MMHG

## 2022-06-06 DIAGNOSIS — M17.12 UNILATERAL PRIMARY OSTEOARTHRITIS, LEFT KNEE: ICD-10-CM

## 2022-06-06 DIAGNOSIS — Z95.2 PRESENCE OF PROSTHETIC HEART VALVE: Chronic | ICD-10-CM

## 2022-06-06 LAB — GLUCOSE BLDC GLUCOMTR-MCNC: 79 MG/DL — SIGNIFICANT CHANGE UP (ref 70–99)

## 2022-06-06 PROCEDURE — S2900 ROBOTIC SURGICAL SYSTEM: CPT | Mod: NC

## 2022-06-06 PROCEDURE — 0055T BONE SRGRY CMPTR CT/MRI IMAG: CPT

## 2022-06-06 PROCEDURE — 27447 TOTAL KNEE ARTHROPLASTY: CPT | Mod: LT

## 2022-06-06 PROCEDURE — 73560 X-RAY EXAM OF KNEE 1 OR 2: CPT | Mod: 26,LT

## 2022-06-06 PROCEDURE — 20985 CPTR-ASST DIR MS PX: CPT

## 2022-06-06 DEVICE — MAKO BONE PIN 3.2MM X 140MM: Type: IMPLANTABLE DEVICE | Site: LEFT | Status: FUNCTIONAL

## 2022-06-06 DEVICE — MAKO BONE PIN 3.2MM X 110MM: Type: IMPLANTABLE DEVICE | Site: LEFT | Status: FUNCTIONAL

## 2022-06-06 DEVICE — BASEPLATE TIB TRIATHLON TRITAN SZ 2: Type: IMPLANTABLE DEVICE | Site: LEFT | Status: FUNCTIONAL

## 2022-06-06 DEVICE — INSERT TIB BEARING CS X3 SZ 2 9MM: Type: IMPLANTABLE DEVICE | Site: LEFT | Status: FUNCTIONAL

## 2022-06-06 DEVICE — COMP FEM CR CMNTLSS BEADED W/ PA SZ 2 LT: Type: IMPLANTABLE DEVICE | Site: LEFT | Status: FUNCTIONAL

## 2022-06-06 RX ORDER — ZOLPIDEM TARTRATE 10 MG/1
1 TABLET ORAL
Qty: 0 | Refills: 0 | DISCHARGE

## 2022-06-06 RX ORDER — SODIUM CHLORIDE 9 MG/ML
500 INJECTION, SOLUTION INTRAVENOUS ONCE
Refills: 0 | Status: COMPLETED | OUTPATIENT
Start: 2022-06-06 | End: 2022-06-06

## 2022-06-06 RX ORDER — LEVOTHYROXINE SODIUM 125 MCG
1 TABLET ORAL
Qty: 0 | Refills: 0 | DISCHARGE

## 2022-06-06 RX ORDER — SODIUM CHLORIDE 9 MG/ML
500 INJECTION, SOLUTION INTRAVENOUS ONCE
Refills: 0 | Status: COMPLETED | OUTPATIENT
Start: 2022-06-06 | End: 2022-06-07

## 2022-06-06 RX ORDER — SIMVASTATIN 20 MG/1
40 TABLET, FILM COATED ORAL AT BEDTIME
Refills: 0 | Status: DISCONTINUED | OUTPATIENT
Start: 2022-06-06 | End: 2022-06-09

## 2022-06-06 RX ORDER — SIMVASTATIN 20 MG/1
1 TABLET, FILM COATED ORAL
Qty: 0 | Refills: 0 | DISCHARGE

## 2022-06-06 RX ORDER — ZOLPIDEM TARTRATE 10 MG/1
5 TABLET ORAL AT BEDTIME
Refills: 0 | Status: DISCONTINUED | OUTPATIENT
Start: 2022-06-06 | End: 2022-06-09

## 2022-06-06 RX ORDER — OXYCODONE HYDROCHLORIDE 5 MG/1
5 TABLET ORAL EVERY 4 HOURS
Refills: 0 | Status: DISCONTINUED | OUTPATIENT
Start: 2022-06-06 | End: 2022-06-09

## 2022-06-06 RX ORDER — ACETAMINOPHEN 500 MG
1000 TABLET ORAL ONCE
Refills: 0 | Status: COMPLETED | OUTPATIENT
Start: 2022-06-07 | End: 2022-06-07

## 2022-06-06 RX ORDER — KETOROLAC TROMETHAMINE 30 MG/ML
15 SYRINGE (ML) INJECTION EVERY 6 HOURS
Refills: 0 | Status: DISCONTINUED | OUTPATIENT
Start: 2022-06-06 | End: 2022-06-06

## 2022-06-06 RX ORDER — BUDESONIDE AND FORMOTEROL FUMARATE DIHYDRATE 160; 4.5 UG/1; UG/1
2 AEROSOL RESPIRATORY (INHALATION)
Refills: 0 | Status: DISCONTINUED | OUTPATIENT
Start: 2022-06-06 | End: 2022-06-09

## 2022-06-06 RX ORDER — HYDROMORPHONE HYDROCHLORIDE 2 MG/ML
0.25 INJECTION INTRAMUSCULAR; INTRAVENOUS; SUBCUTANEOUS
Refills: 0 | Status: DISCONTINUED | OUTPATIENT
Start: 2022-06-06 | End: 2022-06-06

## 2022-06-06 RX ORDER — HYDROCHLOROTHIAZIDE 25 MG
25 TABLET ORAL DAILY
Refills: 0 | Status: DISCONTINUED | OUTPATIENT
Start: 2022-06-06 | End: 2022-06-09

## 2022-06-06 RX ORDER — ACETAMINOPHEN 500 MG
650 TABLET ORAL EVERY 6 HOURS
Refills: 0 | Status: DISCONTINUED | OUTPATIENT
Start: 2022-06-06 | End: 2022-06-06

## 2022-06-06 RX ORDER — OXYCODONE HYDROCHLORIDE 5 MG/1
10 TABLET ORAL EVERY 4 HOURS
Refills: 0 | Status: DISCONTINUED | OUTPATIENT
Start: 2022-06-06 | End: 2022-06-09

## 2022-06-06 RX ORDER — SENNA PLUS 8.6 MG/1
2 TABLET ORAL AT BEDTIME
Refills: 0 | Status: DISCONTINUED | OUTPATIENT
Start: 2022-06-06 | End: 2022-06-09

## 2022-06-06 RX ORDER — LEVOTHYROXINE SODIUM 125 MCG
100 TABLET ORAL DAILY
Refills: 0 | Status: DISCONTINUED | OUTPATIENT
Start: 2022-06-06 | End: 2022-06-09

## 2022-06-06 RX ORDER — METOPROLOL TARTRATE 50 MG
25 TABLET ORAL
Refills: 0 | Status: DISCONTINUED | OUTPATIENT
Start: 2022-06-06 | End: 2022-06-09

## 2022-06-06 RX ORDER — OMEPRAZOLE 10 MG/1
1 CAPSULE, DELAYED RELEASE ORAL
Qty: 0 | Refills: 0 | DISCHARGE

## 2022-06-06 RX ORDER — ASPIRIN/CALCIUM CARB/MAGNESIUM 324 MG
1 TABLET ORAL
Qty: 0 | Refills: 0 | DISCHARGE

## 2022-06-06 RX ORDER — ACETAMINOPHEN 500 MG
975 TABLET ORAL EVERY 8 HOURS
Refills: 0 | Status: DISCONTINUED | OUTPATIENT
Start: 2022-06-07 | End: 2022-06-09

## 2022-06-06 RX ORDER — FLUTICASONE PROPIONATE AND SALMETEROL 50; 250 UG/1; UG/1
2 POWDER ORAL; RESPIRATORY (INHALATION)
Qty: 0 | Refills: 0 | DISCHARGE

## 2022-06-06 RX ORDER — CEFAZOLIN SODIUM 1 G
2000 VIAL (EA) INJECTION EVERY 8 HOURS
Refills: 0 | Status: COMPLETED | OUTPATIENT
Start: 2022-06-06 | End: 2022-06-07

## 2022-06-06 RX ORDER — MONTELUKAST 4 MG/1
1 TABLET, CHEWABLE ORAL
Qty: 0 | Refills: 0 | DISCHARGE

## 2022-06-06 RX ORDER — SODIUM CHLORIDE 9 MG/ML
1000 INJECTION, SOLUTION INTRAVENOUS
Refills: 0 | Status: DISCONTINUED | OUTPATIENT
Start: 2022-06-06 | End: 2022-06-09

## 2022-06-06 RX ORDER — LOSARTAN/HYDROCHLOROTHIAZIDE 100MG-25MG
1 TABLET ORAL
Qty: 0 | Refills: 0 | DISCHARGE

## 2022-06-06 RX ORDER — ASPIRIN/CALCIUM CARB/MAGNESIUM 324 MG
325 TABLET ORAL
Refills: 0 | Status: DISCONTINUED | OUTPATIENT
Start: 2022-06-06 | End: 2022-06-09

## 2022-06-06 RX ORDER — DEXAMETHASONE 0.5 MG/5ML
8 ELIXIR ORAL ONCE
Refills: 0 | Status: COMPLETED | OUTPATIENT
Start: 2022-06-07 | End: 2022-06-07

## 2022-06-06 RX ORDER — MONTELUKAST 4 MG/1
10 TABLET, CHEWABLE ORAL DAILY
Refills: 0 | Status: DISCONTINUED | OUTPATIENT
Start: 2022-06-06 | End: 2022-06-09

## 2022-06-06 RX ORDER — PANTOPRAZOLE SODIUM 20 MG/1
40 TABLET, DELAYED RELEASE ORAL
Refills: 0 | Status: DISCONTINUED | OUTPATIENT
Start: 2022-06-06 | End: 2022-06-09

## 2022-06-06 RX ORDER — ACETAMINOPHEN 500 MG
975 TABLET ORAL EVERY 8 HOURS
Refills: 0 | Status: DISCONTINUED | OUTPATIENT
Start: 2022-06-06 | End: 2022-06-06

## 2022-06-06 RX ORDER — HYDROMORPHONE HYDROCHLORIDE 2 MG/ML
0.5 INJECTION INTRAMUSCULAR; INTRAVENOUS; SUBCUTANEOUS ONCE
Refills: 0 | Status: DISCONTINUED | OUTPATIENT
Start: 2022-06-06 | End: 2022-06-09

## 2022-06-06 RX ORDER — LOSARTAN POTASSIUM 100 MG/1
100 TABLET, FILM COATED ORAL DAILY
Refills: 0 | Status: DISCONTINUED | OUTPATIENT
Start: 2022-06-06 | End: 2022-06-09

## 2022-06-06 RX ORDER — ALBUTEROL 90 UG/1
2 AEROSOL, METERED ORAL EVERY 6 HOURS
Refills: 0 | Status: DISCONTINUED | OUTPATIENT
Start: 2022-06-06 | End: 2022-06-09

## 2022-06-06 RX ORDER — ACETAMINOPHEN 500 MG
1000 TABLET ORAL ONCE
Refills: 0 | Status: DISCONTINUED | OUTPATIENT
Start: 2022-06-06 | End: 2022-06-09

## 2022-06-06 RX ORDER — ALBUTEROL 90 UG/1
2 AEROSOL, METERED ORAL
Qty: 0 | Refills: 0 | DISCHARGE

## 2022-06-06 RX ORDER — ONDANSETRON 8 MG/1
4 TABLET, FILM COATED ORAL EVERY 6 HOURS
Refills: 0 | Status: DISCONTINUED | OUTPATIENT
Start: 2022-06-06 | End: 2022-06-09

## 2022-06-06 RX ORDER — METOPROLOL TARTRATE 50 MG
1 TABLET ORAL
Qty: 0 | Refills: 0 | DISCHARGE

## 2022-06-06 RX ADMIN — SENNA PLUS 2 TABLET(S): 8.6 TABLET ORAL at 21:47

## 2022-06-06 RX ADMIN — SODIUM CHLORIDE 125 MILLILITER(S): 9 INJECTION, SOLUTION INTRAVENOUS at 18:30

## 2022-06-06 RX ADMIN — Medication 100 MILLIGRAM(S): at 23:53

## 2022-06-06 RX ADMIN — SODIUM CHLORIDE 500 MILLILITER(S): 9 INJECTION, SOLUTION INTRAVENOUS at 20:02

## 2022-06-06 RX ADMIN — PANTOPRAZOLE SODIUM 40 MILLIGRAM(S): 20 TABLET, DELAYED RELEASE ORAL at 14:40

## 2022-06-06 RX ADMIN — SODIUM CHLORIDE 500 MILLILITER(S): 9 INJECTION, SOLUTION INTRAVENOUS at 18:30

## 2022-06-06 RX ADMIN — Medication 150 MILLIGRAM(S): at 14:40

## 2022-06-06 RX ADMIN — TRAMADOL HYDROCHLORIDE 50 MILLIGRAM(S): 50 TABLET ORAL at 14:53

## 2022-06-06 NOTE — PRE-ANESTHESIA EVALUATION ADULT - NSANTHPMHFT_GEN_ALL_CORE
chart and consultant notes reviewed. hx as s/p remote avr - pt states stable clinical status since. single vessel cad dx'd same time -> med mgmt. et 1 flight, no cp/sob. ekg sr. recent tte essentially unremarkable. b/l JEANNINE < 50% occlusion, asymptomatic. cri, at/near apparent baseline. mild gerd, controlled

## 2022-06-06 NOTE — PRE-ANESTHESIA EVALUATION ADULT - NSANTHTIREDRD_ENT_A_CORE
Assessment done per SGNA guidelines. Breathing non-labored, skin warm and dry.     
Assessment done per SGNA guidelines. Breathing non-labored, skin warm and dry.     
No

## 2022-06-06 NOTE — CHART NOTE - NSCHARTNOTEFT_GEN_A_CORE
Resting without complaints.  No Chest Pain, SOB, N/V.    T(C): 36.2 (06-06-22 @ 18:45), Max: 36.5 (06-06-22 @ 13:05)  HR: 66 (06-06-22 @ 19:15) (61 - 70)  BP: 148/66 (06-06-22 @ 19:15) (108/49 - 197/66)  RR: 17 (06-06-22 @ 19:15) (14 - 18)  SpO2: 97% (06-06-22 @ 19:15) (97% - 100%)      Exam:  Alert and [place], No Acute Distress  Card: +S1/S2, RRR  Pulm: CTAB  Laterality: L Knee  Aquacel c/d/i  Calves soft, non-tender bilaterally  +PF/DF/EHL/FHL  SILT  + DP pulse    Xray: S/P L Total knee Arthroplasty, prosthesis in place and intact with no signs of trish-prosthetic Fx.             A/P: 82y Female S/p L Total Knee Arthroplasty. VSS. NAD  -PT/OT-WBAT LLE  -F/U AM Labs  -IS  -DVT PPx: ASA 325mg PO BID and SCDs  -Pain Control  -Continue Current Tx  -Dispo planning pacu to Floor    Neo Roberson PA-C  Orthopedic Surgery Team  Team Pager #[phone]

## 2022-06-06 NOTE — PATIENT PROFILE ADULT - FUNCTIONAL ASSESSMENT - BASIC MOBILITY 4.
----- Message from Cira Morocho sent at 5/13/2020  2:42 PM CDT -----  Contact: patient  Type:  RX Refill Request    Who Called:  patient  Refill or New Rx:  refill  RX Name and Strength:  sildenafil (REVATIO) 20 mg Tab  How is the patient currently taking it? (ex. 1XDay):  As directed   Is this a 30 day or 90 day RX:  90  Preferred Pharmacy with phone number:    [email or web address]  [facility] - [address]  [address]  [place] [address]  Phone: [phone] Fax: [phone]  Local or Mail Order:  local  Ordering Provider:  pablito Kim Call Back Number:  [phone]  Additional Information:  Please advise-thank you     4 = No assist / stand by assistance

## 2022-06-06 NOTE — PATIENT PROFILE ADULT - FALL HARM RISK - UNIVERSAL INTERVENTIONS
Bed in lowest position, wheels locked, appropriate side rails in place/Call bell, personal items and telephone in reach/Instruct patient to call for assistance before getting out of bed or chair/Non-slip footwear when patient is out of bed/[place] to call system/Physically safe environment - no spills, clutter or unnecessary equipment/Purposeful Proactive Rounding/Room/bathroom lighting operational, light cord in reach

## 2022-06-06 NOTE — PRE-ANESTHESIA EVALUATION ADULT - NSANTHADDINFOFT_GEN_ALL_CORE
extensive rba dw pt at bedside, agrees with plan. nerve block for post op pain specifically declined by surgeon

## 2022-06-06 NOTE — PRE-ANESTHESIA EVALUATION ADULT - NSANTHGENDERRD_ENT_A_CORE
PATIENT INFORMATION     Eat organic fruits and vegetables.  Avoid processed food/fast food.  Avoid sugary drinks and desserts.  If possible, exercise is recommended 45 minutes per day.   For Mental Stress - Meditation is recommended 30 minutes per day. Website - www.mindProm[employer].org.  Avoid exposure to tobacco/marijuana.  Avoid excess caffeine or alcohol use.  Take Calcium 600mg 2 tablets and Vit D3 2000 units after Dinner.     Imaging Scheduling - Please call     Labs and Xrays: Basement   Hours - 7am to 5 pm M-F and until Noon on Saturday.    For fasting labs - Do not eat 8 hours prior to blood draw - ok to drink water and take nondiabetes medications.      Do not hesitate to call if you have any matter that concerns you.     The following preventive health measures are overdue for you --  please get these per your convenience.  Health Maintenance Due   Topic Date Due   • Influenza Vaccine (1) 09/01/2019         You may get a survey in the mail/online.   Please take time to fill it and give us your feedback on your experience with me.   Thanks for choosing me to provide your health care today.        
No

## 2022-06-07 ENCOUNTER — TRANSCRIPTION ENCOUNTER (OUTPATIENT)
Age: 82
End: 2022-06-07

## 2022-06-07 LAB
ANION GAP SERPL CALC-SCNC: 9 MMOL/L — SIGNIFICANT CHANGE UP (ref 5–17)
BUN SERPL-MCNC: 26 MG/DL — HIGH (ref 7–23)
CALCIUM SERPL-MCNC: 9 MG/DL — SIGNIFICANT CHANGE UP (ref 8.4–10.5)
CHLORIDE SERPL-SCNC: 105 MMOL/L — SIGNIFICANT CHANGE UP (ref 96–108)
CO2 SERPL-SCNC: 25 MMOL/L — SIGNIFICANT CHANGE UP (ref 22–31)
CREAT SERPL-MCNC: 1.34 MG/DL — HIGH (ref 0.5–1.3)
EGFR: 39 ML/MIN/1.73M2 — LOW
GLUCOSE SERPL-MCNC: 206 MG/DL — HIGH (ref 70–99)
HCT VFR BLD CALC: 33.1 % — LOW (ref 34.5–45)
HGB BLD-MCNC: 10.4 G/DL — LOW (ref 11.5–15.5)
MCHC RBC-ENTMCNC: 30.3 PG — SIGNIFICANT CHANGE UP (ref 27–34)
MCHC RBC-ENTMCNC: 31.4 GM/DL — LOW (ref 32–36)
MCV RBC AUTO: 96.5 FL — SIGNIFICANT CHANGE UP (ref 80–100)
NRBC # BLD: 0 /100 WBCS — SIGNIFICANT CHANGE UP (ref 0–0)
PLATELET # BLD AUTO: 181 K/UL — SIGNIFICANT CHANGE UP (ref 150–400)
POTASSIUM SERPL-MCNC: 5 MMOL/L — SIGNIFICANT CHANGE UP (ref 3.5–5.3)
POTASSIUM SERPL-SCNC: 5 MMOL/L — SIGNIFICANT CHANGE UP (ref 3.5–5.3)
RBC # BLD: 3.43 M/UL — LOW (ref 3.8–5.2)
RBC # FLD: 13.1 % — SIGNIFICANT CHANGE UP (ref 10.3–14.5)
SODIUM SERPL-SCNC: 139 MMOL/L — SIGNIFICANT CHANGE UP (ref 135–145)
WBC # BLD: 11.27 K/UL — HIGH (ref 3.8–10.5)
WBC # FLD AUTO: 11.27 K/UL — HIGH (ref 3.8–10.5)

## 2022-06-07 RX ORDER — ACETAMINOPHEN 500 MG
3 TABLET ORAL
Qty: 0 | Refills: 0 | DISCHARGE
Start: 2022-06-07

## 2022-06-07 RX ORDER — OXYCODONE HYDROCHLORIDE 5 MG/1
1 TABLET ORAL
Qty: 0 | Refills: 0 | DISCHARGE
Start: 2022-06-07

## 2022-06-07 RX ORDER — MONTELUKAST 4 MG/1
1 TABLET, CHEWABLE ORAL
Qty: 0 | Refills: 0 | DISCHARGE
Start: 2022-06-07

## 2022-06-07 RX ORDER — LEVOTHYROXINE SODIUM 125 MCG
1 TABLET ORAL
Qty: 0 | Refills: 0 | DISCHARGE
Start: 2022-06-07

## 2022-06-07 RX ORDER — SENNA PLUS 8.6 MG/1
2 TABLET ORAL
Qty: 0 | Refills: 0 | DISCHARGE
Start: 2022-06-07

## 2022-06-07 RX ORDER — ZOLPIDEM TARTRATE 10 MG/1
1 TABLET ORAL
Qty: 0 | Refills: 0 | DISCHARGE
Start: 2022-06-07

## 2022-06-07 RX ORDER — METOPROLOL TARTRATE 50 MG
1 TABLET ORAL
Qty: 0 | Refills: 0 | DISCHARGE
Start: 2022-06-07

## 2022-06-07 RX ORDER — ASPIRIN/CALCIUM CARB/MAGNESIUM 324 MG
1 TABLET ORAL
Qty: 0 | Refills: 0 | DISCHARGE
Start: 2022-06-07

## 2022-06-07 RX ORDER — SIMVASTATIN 20 MG/1
1 TABLET, FILM COATED ORAL
Qty: 0 | Refills: 0 | DISCHARGE
Start: 2022-06-07

## 2022-06-07 RX ORDER — ALBUTEROL 90 UG/1
2 AEROSOL, METERED ORAL
Qty: 0 | Refills: 0 | DISCHARGE
Start: 2022-06-07

## 2022-06-07 RX ADMIN — Medication 1000 MILLIGRAM(S): at 12:37

## 2022-06-07 RX ADMIN — Medication 25 MILLIGRAM(S): at 06:04

## 2022-06-07 RX ADMIN — Medication 975 MILLIGRAM(S): at 19:57

## 2022-06-07 RX ADMIN — Medication 325 MILLIGRAM(S): at 17:19

## 2022-06-07 RX ADMIN — Medication 1000 MILLIGRAM(S): at 03:20

## 2022-06-07 RX ADMIN — LOSARTAN POTASSIUM 100 MILLIGRAM(S): 100 TABLET, FILM COATED ORAL at 06:05

## 2022-06-07 RX ADMIN — OXYCODONE HYDROCHLORIDE 10 MILLIGRAM(S): 5 TABLET ORAL at 08:52

## 2022-06-07 RX ADMIN — OXYCODONE HYDROCHLORIDE 10 MILLIGRAM(S): 5 TABLET ORAL at 09:52

## 2022-06-07 RX ADMIN — Medication 101.6 MILLIGRAM(S): at 06:05

## 2022-06-07 RX ADMIN — BUDESONIDE AND FORMOTEROL FUMARATE DIHYDRATE 2 PUFF(S): 160; 4.5 AEROSOL RESPIRATORY (INHALATION) at 06:05

## 2022-06-07 RX ADMIN — OXYCODONE HYDROCHLORIDE 10 MILLIGRAM(S): 5 TABLET ORAL at 22:30

## 2022-06-07 RX ADMIN — BUDESONIDE AND FORMOTEROL FUMARATE DIHYDRATE 2 PUFF(S): 160; 4.5 AEROSOL RESPIRATORY (INHALATION) at 17:19

## 2022-06-07 RX ADMIN — SIMVASTATIN 40 MILLIGRAM(S): 20 TABLET, FILM COATED ORAL at 19:56

## 2022-06-07 RX ADMIN — Medication 400 MILLIGRAM(S): at 02:20

## 2022-06-07 RX ADMIN — Medication 25 MILLIGRAM(S): at 17:19

## 2022-06-07 RX ADMIN — Medication 100 MILLIGRAM(S): at 06:40

## 2022-06-07 RX ADMIN — OXYCODONE HYDROCHLORIDE 10 MILLIGRAM(S): 5 TABLET ORAL at 20:33

## 2022-06-07 RX ADMIN — Medication 325 MILLIGRAM(S): at 06:06

## 2022-06-07 RX ADMIN — PANTOPRAZOLE SODIUM 40 MILLIGRAM(S): 20 TABLET, DELAYED RELEASE ORAL at 06:05

## 2022-06-07 RX ADMIN — SENNA PLUS 2 TABLET(S): 8.6 TABLET ORAL at 19:55

## 2022-06-07 RX ADMIN — Medication 400 MILLIGRAM(S): at 12:14

## 2022-06-07 RX ADMIN — Medication 100 MICROGRAM(S): at 06:06

## 2022-06-07 RX ADMIN — MONTELUKAST 10 MILLIGRAM(S): 4 TABLET, CHEWABLE ORAL at 12:14

## 2022-06-07 RX ADMIN — Medication 975 MILLIGRAM(S): at 21:30

## 2022-06-07 RX ADMIN — SODIUM CHLORIDE 500 MILLILITER(S): 9 INJECTION, SOLUTION INTRAVENOUS at 06:08

## 2022-06-07 NOTE — DISCHARGE NOTE PROVIDER - NSDCFUSCHEDAPPT_GEN_ALL_CORE_FT
[facility]  [facility] [address]  Scheduled Appointment: 09/06/2022

## 2022-06-07 NOTE — PHYSICAL THERAPY INITIAL EVALUATION ADULT - ADDITIONAL COMMENTS
Prior to admission pt reports being independent of all ADL's & functional mobility without AD. Pt resides in apt with dtr, 6 steps total to enter, +elevator access. + tub with grab bars. Prior to admission pt reports being independent of all ADL's & functional mobility without AD. Pt resides in apt with dtr, 6 steps total to enter, +elevator access. + tub with grab bars. Pt states there is ramp entrance into apt. Pt owns RW.

## 2022-06-07 NOTE — DISCHARGE NOTE PROVIDER - NSDCFUADDAPPT_GEN_ALL_CORE_FT
Please call Dr. Sanchez' s office within next few days to schedule a follow up appointment about 14 days after surgery.   Recommend follow up with medical MD, within next 4 weeks.

## 2022-06-07 NOTE — PROGRESS NOTE ADULT - SUBJECTIVE AND OBJECTIVE BOX
ORTHOPEDICS PROGRESS NOTE     Pt seen and examined at bedside, denies SOB, CP, Dizziness. N/V/D /HA.  No significant overnight events. Pain well controlled. Patient has been OOB.     Vital Signs Last 24 Hrs  T(C): 36.6 (07 Jun 2022 05:39), Max: 36.6 (07 Jun 2022 02:00)  T(F): 97.9 (07 Jun 2022 05:39), Max: 97.9 (07 Jun 2022 02:00)  HR: 67 (07 Jun 2022 05:39) (61 - 79)  BP: 135/66 (07 Jun 2022 05:39) (108/49 - 197/66)  BP(mean): 95 (06 Jun 2022 19:15) (77 - 95)  RR: 18 (07 Jun 2022 05:39) (14 - 19)  SpO2: 98% (07 Jun 2022 05:39) (97% - 100%)    Gen: No apparent distress. Denies pain.  Left LE:   Dressing (Aquacel) C/D/I  BLE: motor intact EHL/FHL/TA/GS .  Sensation is grossly intact to light touch in the distal extremities.  Compartments are soft bilaterally.  Extremities are warm .  DP 2+ BLE     Labs: pending AM collection.         A/P  Pt is a 82 year old female s/p left Knee Arthroplasty with Adi robotic assist, POD #1  - Pain control/ Analgesia  - DVT ppx  BID,  SCDs  - PT/OT - wbat/oob    - Incentive Spirometer encouraged  - notify Ortho for questions   - Dispo: Pending PT/OT recs, planning home.     Christina Carlos PA-C  Team Pager #[phone]

## 2022-06-07 NOTE — DISCHARGE NOTE PROVIDER - NSDCMRMEDTOKEN_GEN_ALL_CORE_FT
acetaminophen 325 mg oral tablet: 3 tab(s) orally every 8 hours   acetaminophen 325 mg oral tablet: 3 tab(s) orally every 8 hours  albuterol 90 mcg/inh inhalation aerosol: 2 puff(s) inhaled every 6 hours, As needed, Shortness of Breath and/or Wheezing  aspirin 325 mg oral delayed release tablet: 1 tab(s) orally 2 times a day  levothyroxine 100 mcg (0.1 mg) oral tablet: 1 tab(s) orally once a day  metoprolol tartrate 25 mg oral tablet: 1 tab(s) orally 2 times a day  montelukast 10 mg oral tablet: 1 tab(s) orally once a day  oxyCODONE 5 mg oral tablet: 1 tab(s) orally every 4 hours, As needed, Moderate Pain (4 - 6)  senna oral tablet: 2 tab(s) orally once a day (at bedtime)  simvastatin 40 mg oral tablet: 1 tab(s) orally once a day (at bedtime)  zolpidem 5 mg oral tablet: 1 tab(s) orally once a day (at bedtime), As needed, Insomnia

## 2022-06-07 NOTE — PHYSICAL THERAPY INITIAL EVALUATION ADULT - STRENGTHENING, PT EVAL
GOAL: Pt will improve L LE strength to 4/5, for increased limb stability, to improve gait and facilitate stair negotiation in 2 weeks.

## 2022-06-07 NOTE — DISCHARGE NOTE PROVIDER - NSDCFUADDINST_GEN_ALL_CORE_FT
Dressing will be changed during office visit.   Patient may shower, limit direct water to dressing, if wet pat dry.   Out of bed, ambulate, weight bearing as tolerated-   Physical therapy to assist with exercise and help increase endurance.   Please contact Doctors office regarding arrangements for out patient Physical therapy.

## 2022-06-07 NOTE — DISCHARGE NOTE PROVIDER - CARE PROVIDER_API CALL
Shea Sanchez)  Orthopaedic Surgery  [address], [address]  [place] [address]  Phone: [phone]  Fax: [phone]  Follow Up Time:

## 2022-06-07 NOTE — DISCHARGE NOTE NURSING/CASE MANAGEMENT/SOCIAL WORK - NSDCPEFALRISK_GEN_ALL_CORE
For information on Fall & Injury Prevention, visit: [email or web address] OR  [email or web address] OR  https://www.cdc.gov/steadi/patient.html

## 2022-06-07 NOTE — DISCHARGE NOTE PROVIDER - HOSPITAL COURSE
History of Present Illness    82 year old female with PMH Obesity (BMI 35.0), HTN, Hypothyroidism, Carotid Artery Stenosis, Severe AS s/p AVR (Bovine Valve) () and Asthma reports c/o intermittent, achy B/L knee pain, which is severe in intensity x 4 years. At rest, she rates her pain 3/10, at its worse, she rates the pain 7/10. The pain substantially limits ADLs. Walking tolerance is reduced. Medication and activity modification have been minimally effective for a period lasting greater than 3 months in duration. Due to the severity of OA and level of pain, PT is contraindicated. The patient denies any radiation of the pain to the feet and is not a/w numbness, tingling or weakness. Previously has tried hyaluronic acid injections with last injection in , which did not help at that time. Cortisone injections in 2021 only helped temporarily. Tylenol and advil provide short-lasting relief. Pt now presents to PST for scheduled Left Total Knee Arthroplasty with CARLITOS Robot on 22 with Dr. Sanchez.     Covid PCR test scheduled for 6/3/22 at [facility]  Covid vaccine Pfizer 2nd dose received 21; Pfizer booster received 21  No recent hospitalizations over the last 3 months  Denies recent travel, exposure or Covid symptoms     Allergies/Medications:   Allergies:        Allergies:  	No Known Allergies:     Home Medications:   * Patient Currently Takes Medications as of 16-May-2022 10:26 documented in Structured Notes  · 	simvastatin 40 mg oral tablet: Last Dose Taken:  , 1 tab(s) orally once a day (at bedtime)  · 	montelukast 10 mg oral tablet: Last Dose Taken:  , 1 tab(s) orally once a day  · 	omeprazole 20 mg oral delayed release tablet: Last Dose Taken:  , 1 tab(s) orally once a day  · 	metoprolol tartrate 25 mg oral tablet: Last Dose Taken:  , 1 tab(s) orally 2 times a day  · 	Aspir 81 oral delayed release tablet: Last Dose Taken:  , 1 tab(s) orally once a day  · 	Advair  mcg-21 mcg/inh inhalation aerosol: Last Dose Taken:  , 2 puff(s) inhaled 2 times a day  · 	zolpidem 10 mg oral tablet: Last Dose Taken:  , 1 tab(s) orally once a day (at bedtime)  · 	Albuterol (Eqv-ProAir HFA) 90 mcg/inh inhalation aerosol: Last Dose Taken:  , 2 puff(s) inhaled every 6 hours, As Needed  · 	levothyroxine 100 mcg (0.1 mg) oral tablet: Last Dose Taken:  , 1 tab(s) orally once a day  · 	losartan-hydrochlorothiazide 100 mg-25 mg oral tablet: Last Dose Taken:  , 1 tab(s) orally once a day  · 	calcium 1200 mg plus D3 5000 IU: Last Dose Taken:  , 1 tab(s) orally once a day    PMH/PSH/FH/SH:    Past Medical, Past Surgical, and Family History:  PAST MEDICAL HISTORY:  Adult hypothyroidism Managed by PCP  Asthma On advair - Uses Albuterol Rescue Inhaler once at bedtime daily - Pt states Montelukast was added to her regimen last week with pulmonologist to reduce rescue inhaler use  Former smoker 0.1 PPD x 8 years; Quit over 30 years ago  GERD (gastroesophageal reflux disease)   H/O carotid artery stenosis Pt unsure of which side - B/L Carotid Doppler Study annually with cardiologist to monitor  HLD (hyperlipidemia)    (normal spontaneous vaginal delivery) x 2  Obesity BMI 35.0  Personal History of Hypertension   Severe aortic stenosis s/p AVR replacement with Bovine valve (2018)  Unilateral primary osteoarthritis, left knee Pending Left Total Knee Arthroplasty with CARLITOS Robot on 22 with Dr. Sanchez.     PAST SURGICAL HISTORY:  H/O aortic valve replacement Open heart surgery with Bovine Aortic Valve Replacement (2018) - on ASA 81 mg  S/P cataract surgery B/L with IOL Implant (over 20 years ago)  Tubal ligation status At age 17.      Arturo Patel PA-C  Orthopaedic Surgery  Team pager [phone]  [phone]       22: Patient presents to the hospital for elective surgery, underwent a left total knee replacement-  -tolerated procedure without complications.  Patient was evaluated by Physical therapy whom recommended home with out patient PT for discharge plan.  Patient is stable for discharge when cleared by PT. History of Present Illness    82 year old female with PMH Obesity (BMI 35.0), HTN, Hypothyroidism, Carotid Artery Stenosis, Severe AS s/p AVR (Bovine Valve) () and Asthma reports c/o intermittent, achy B/L knee pain, which is severe in intensity x 4 years. At rest, she rates her pain 3/10, at its worse, she rates the pain 7/10. The pain substantially limits ADLs. Walking tolerance is reduced. Medication and activity modification have been minimally effective for a period lasting greater than 3 months in duration. Due to the severity of OA and level of pain, PT is contraindicated. The patient denies any radiation of the pain to the feet and is not a/w numbness, tingling or weakness. Previously has tried hyaluronic acid injections with last injection in , which did not help at that time. Cortisone injections in 2021 only helped temporarily. Tylenol and advil provide short-lasting relief. Pt now presents to PST for scheduled Left Total Knee Arthroplasty with CARLITOS Robot on 22 with Dr. Sanchez.     Goal:  To have less pain.    Covid PCR test scheduled for 6/3/22 at [facility]  Covid vaccine Pfizer 2nd dose received 21; Pfizer booster received 21  No recent hospitalizations over the last 3 months  Denies recent travel, exposure or Covid symptoms     Allergies/Medications:   Allergies:        Allergies:  	No Known Allergies:     Home Medications:   * Patient Currently Takes Medications as of 16-May-2022 10:26 documented in Structured Notes  · 	simvastatin 40 mg oral tablet: Last Dose Taken:  , 1 tab(s) orally once a day (at bedtime)  · 	montelukast 10 mg oral tablet: Last Dose Taken:  , 1 tab(s) orally once a day  · 	omeprazole 20 mg oral delayed release tablet: Last Dose Taken:  , 1 tab(s) orally once a day  · 	metoprolol tartrate 25 mg oral tablet: Last Dose Taken:  , 1 tab(s) orally 2 times a day  · 	Aspir 81 oral delayed release tablet: Last Dose Taken:  , 1 tab(s) orally once a day  · 	Advair  mcg-21 mcg/inh inhalation aerosol: Last Dose Taken:  , 2 puff(s) inhaled 2 times a day  · 	zolpidem 10 mg oral tablet: Last Dose Taken:  , 1 tab(s) orally once a day (at bedtime)  · 	Albuterol (Eqv-ProAir HFA) 90 mcg/inh inhalation aerosol: Last Dose Taken:  , 2 puff(s) inhaled every 6 hours, As Needed  · 	levothyroxine 100 mcg (0.1 mg) oral tablet: Last Dose Taken:  , 1 tab(s) orally once a day  · 	losartan-hydrochlorothiazide 100 mg-25 mg oral tablet: Last Dose Taken:  , 1 tab(s) orally once a day  · 	calcium 1200 mg plus D3 5000 IU: Last Dose Taken:  , 1 tab(s) orally once a day    PMH/PSH/FH/SH:    Past Medical, Past Surgical, and Family History:  PAST MEDICAL HISTORY:  Adult hypothyroidism Managed by PCP  Asthma On advair - Uses Albuterol Rescue Inhaler once at bedtime daily - Pt states Montelukast was added to her regimen last week with pulmonologist to reduce rescue inhaler use  Former smoker 0.1 PPD x 8 years; Quit over 30 years ago  GERD (gastroesophageal reflux disease)   H/O carotid artery stenosis Pt unsure of which side - B/L Carotid Doppler Study annually with cardiologist to monitor  HLD (hyperlipidemia)    (normal spontaneous vaginal delivery) x 2  Obesity BMI 35.0  Personal History of Hypertension   Severe aortic stenosis s/p AVR replacement with Bovine valve (2018)  Unilateral primary osteoarthritis, left knee Pending Left Total Knee Arthroplasty with CARLITOS Robot on 22 with Dr. Sanchez.     PAST SURGICAL HISTORY:  H/O aortic valve replacement Open heart surgery with Bovine Aortic Valve Replacement (2018) - on ASA 81 mg  S/P cataract surgery B/L with IOL Implant (over 20 years ago)  Tubal ligation status At age 17.      Arturo Patel PA-C  Orthopaedic Surgery  Team pager [phone]  [phone]       22: Patient presents to the hospital for elective surgery, underwent a left total knee replacement-  -tolerated procedure without complications.  Patient was evaluated by Physical therapy whom recommended home with out patient PT for discharge plan.  Patient is stable for discharge when cleared by PT.

## 2022-06-08 RX ADMIN — SIMVASTATIN 40 MILLIGRAM(S): 20 TABLET, FILM COATED ORAL at 21:52

## 2022-06-08 RX ADMIN — Medication 975 MILLIGRAM(S): at 22:35

## 2022-06-08 RX ADMIN — MONTELUKAST 10 MILLIGRAM(S): 4 TABLET, CHEWABLE ORAL at 11:04

## 2022-06-08 RX ADMIN — BUDESONIDE AND FORMOTEROL FUMARATE DIHYDRATE 2 PUFF(S): 160; 4.5 AEROSOL RESPIRATORY (INHALATION) at 05:54

## 2022-06-08 RX ADMIN — SENNA PLUS 2 TABLET(S): 8.6 TABLET ORAL at 21:52

## 2022-06-08 RX ADMIN — Medication 25 MILLIGRAM(S): at 05:52

## 2022-06-08 RX ADMIN — Medication 325 MILLIGRAM(S): at 05:52

## 2022-06-08 RX ADMIN — Medication 975 MILLIGRAM(S): at 13:07

## 2022-06-08 RX ADMIN — Medication 25 MILLIGRAM(S): at 17:25

## 2022-06-08 RX ADMIN — Medication 975 MILLIGRAM(S): at 14:07

## 2022-06-08 RX ADMIN — OXYCODONE HYDROCHLORIDE 10 MILLIGRAM(S): 5 TABLET ORAL at 11:06

## 2022-06-08 RX ADMIN — Medication 325 MILLIGRAM(S): at 17:25

## 2022-06-08 RX ADMIN — Medication 975 MILLIGRAM(S): at 05:53

## 2022-06-08 RX ADMIN — Medication 975 MILLIGRAM(S): at 06:53

## 2022-06-08 RX ADMIN — PANTOPRAZOLE SODIUM 40 MILLIGRAM(S): 20 TABLET, DELAYED RELEASE ORAL at 05:53

## 2022-06-08 RX ADMIN — OXYCODONE HYDROCHLORIDE 10 MILLIGRAM(S): 5 TABLET ORAL at 12:06

## 2022-06-08 RX ADMIN — LOSARTAN POTASSIUM 100 MILLIGRAM(S): 100 TABLET, FILM COATED ORAL at 05:52

## 2022-06-08 RX ADMIN — Medication 100 MICROGRAM(S): at 05:52

## 2022-06-08 RX ADMIN — Medication 975 MILLIGRAM(S): at 21:52

## 2022-06-08 RX ADMIN — BUDESONIDE AND FORMOTEROL FUMARATE DIHYDRATE 2 PUFF(S): 160; 4.5 AEROSOL RESPIRATORY (INHALATION) at 17:25

## 2022-06-08 NOTE — PROGRESS NOTE ADULT - SUBJECTIVE AND OBJECTIVE BOX
Patient seen and examined at the bedside. No acute overnight events, no complaints offered at this time. Pain has been well controlled. She denies chest pain, dyspnea, fever, N/V/D, headache, dizziness. She has been ambulating with PT.    Vital Signs Last 24 Hrs  T(C): 36.7 (08 Jun 2022 05:11), Max: 36.9 (07 Jun 2022 20:46)  T(F): 98 (08 Jun 2022 05:11), Max: 98.5 (07 Jun 2022 20:46)  HR: 68 (08 Jun 2022 05:11) (57 - 86)  BP: 113/64 (08 Jun 2022 05:11) (113/64 - 160/67)  BP(mean): --  RR: 16 (08 Jun 2022 05:11) (16 - 18)  SpO2: 98% (08 Jun 2022 05:11) (97% - 99%)    GEN: resting comfortably in bed, NAD, denies pain  Extremities:   Left LE:  Aquacel dressing C/D/I  B/L LE:  Sensation intact to touch in distal lower extremities. Motor intact EHL/FHL/TA/GS. Compatemtns soft bilaterally. Extremities warm. DP/PT 2+ BLE.    Labs: Pending AM collection    A/P: 82F POD#2 s/p left knee arthroplasty with Adi robotic assist, progressing well and vitally stable with no acute complaints   Patient seen and examined at the bedside. No acute overnight events, no complaints offered at this time. Pain has been well controlled. She denies chest pain, dyspnea, fever, N/V/D, headache, dizziness. She has been ambulating with PT.    Vital Signs Last 24 Hrs  T(C): 36.7 (08 Jun 2022 05:11), Max: 36.9 (07 Jun 2022 20:46)  T(F): 98 (08 Jun 2022 05:11), Max: 98.5 (07 Jun 2022 20:46)  HR: 68 (08 Jun 2022 05:11) (57 - 86)  BP: 113/64 (08 Jun 2022 05:11) (113/64 - 160/67)  BP(mean): --  RR: 16 (08 Jun 2022 05:11) (16 - 18)  SpO2: 98% (08 Jun 2022 05:11) (97% - 99%)    GEN: resting comfortably in bed, NAD, denies pain  Extremities:   Left LE:  Aquacel dressing C/D/I  B/L LE:  Sensation intact to touch in distal lower extremities. Motor intact EHL/FHL/TA/GS. Compatemtns soft bilaterally. Extremities warm. DP/PT 2+ BLE.    Labs: Pending AM collection    A/P: 82F POD#2 s/p left knee arthroplasty with Adi robotic assist, progressing well and vitally stable with no acute complaints  - Continue current pain regimen  - PT: WBAT/OOB, Dispo = home w/ home PT  - OT dispo = home w/ home OT  - DVT ppx: ASA 325mg BID + SCDs  - Incentive spirometer encouraged      ESTELLE Wang  Orthopedic Service pager [phone]

## 2022-06-09 VITALS
TEMPERATURE: 98 F | DIASTOLIC BLOOD PRESSURE: 62 MMHG | HEART RATE: 61 BPM | RESPIRATION RATE: 18 BRPM | SYSTOLIC BLOOD PRESSURE: 119 MMHG | OXYGEN SATURATION: 99 %

## 2022-06-09 PROCEDURE — 86850 RBC ANTIBODY SCREEN: CPT

## 2022-06-09 PROCEDURE — 94640 AIRWAY INHALATION TREATMENT: CPT

## 2022-06-09 PROCEDURE — 97162 PT EVAL MOD COMPLEX 30 MIN: CPT

## 2022-06-09 PROCEDURE — 97530 THERAPEUTIC ACTIVITIES: CPT

## 2022-06-09 PROCEDURE — 85027 COMPLETE CBC AUTOMATED: CPT

## 2022-06-09 PROCEDURE — 86900 BLOOD TYPING SEROLOGIC ABO: CPT

## 2022-06-09 PROCEDURE — 97165 OT EVAL LOW COMPLEX 30 MIN: CPT

## 2022-06-09 PROCEDURE — 80048 BASIC METABOLIC PNL TOTAL CA: CPT

## 2022-06-09 PROCEDURE — 97116 GAIT TRAINING THERAPY: CPT

## 2022-06-09 PROCEDURE — 86901 BLOOD TYPING SEROLOGIC RH(D): CPT

## 2022-06-09 PROCEDURE — C1713: CPT

## 2022-06-09 PROCEDURE — 73560 X-RAY EXAM OF KNEE 1 OR 2: CPT

## 2022-06-09 PROCEDURE — 82962 GLUCOSE BLOOD TEST: CPT

## 2022-06-09 PROCEDURE — S2900: CPT

## 2022-06-09 PROCEDURE — 97110 THERAPEUTIC EXERCISES: CPT

## 2022-06-09 PROCEDURE — 97535 SELF CARE MNGMENT TRAINING: CPT

## 2022-06-09 PROCEDURE — C1776: CPT

## 2022-06-09 RX ORDER — DOCUSATE SODIUM 100 MG
1 CAPSULE ORAL
Qty: 28 | Refills: 0
Start: 2022-06-09 | End: 2022-06-22

## 2022-06-09 RX ORDER — OXYCODONE HYDROCHLORIDE 5 MG/1
1 TABLET ORAL
Qty: 42 | Refills: 0
Start: 2022-06-09 | End: 2022-06-15

## 2022-06-09 RX ORDER — ASPIRIN/CALCIUM CARB/MAGNESIUM 324 MG
1 TABLET ORAL
Qty: 84 | Refills: 0
Start: 2022-06-09 | End: 2022-07-20

## 2022-06-09 RX ORDER — ACETAMINOPHEN 500 MG
3 TABLET ORAL
Qty: 63 | Refills: 0
Start: 2022-06-09 | End: 2022-06-15

## 2022-06-09 RX ORDER — PANTOPRAZOLE SODIUM 20 MG/1
1 TABLET, DELAYED RELEASE ORAL
Qty: 42 | Refills: 0
Start: 2022-06-09 | End: 2022-07-20

## 2022-06-09 RX ORDER — SENNA PLUS 8.6 MG/1
2 TABLET ORAL
Qty: 28 | Refills: 0
Start: 2022-06-09 | End: 2022-06-22

## 2022-06-09 RX ORDER — FLUTICASONE PROPIONATE AND SALMETEROL 50; 250 UG/1; UG/1
2 POWDER ORAL; RESPIRATORY (INHALATION)
Qty: 0 | Refills: 0 | DISCHARGE

## 2022-06-09 RX ADMIN — Medication 325 MILLIGRAM(S): at 05:11

## 2022-06-09 RX ADMIN — Medication 975 MILLIGRAM(S): at 13:43

## 2022-06-09 RX ADMIN — OXYCODONE HYDROCHLORIDE 10 MILLIGRAM(S): 5 TABLET ORAL at 00:49

## 2022-06-09 RX ADMIN — Medication 975 MILLIGRAM(S): at 13:11

## 2022-06-09 RX ADMIN — Medication 975 MILLIGRAM(S): at 05:11

## 2022-06-09 RX ADMIN — MONTELUKAST 10 MILLIGRAM(S): 4 TABLET, CHEWABLE ORAL at 11:39

## 2022-06-09 RX ADMIN — Medication 25 MILLIGRAM(S): at 05:12

## 2022-06-09 RX ADMIN — Medication 25 MILLIGRAM(S): at 05:11

## 2022-06-09 RX ADMIN — Medication 100 MICROGRAM(S): at 05:11

## 2022-06-09 RX ADMIN — Medication 975 MILLIGRAM(S): at 06:30

## 2022-06-09 RX ADMIN — PANTOPRAZOLE SODIUM 40 MILLIGRAM(S): 20 TABLET, DELAYED RELEASE ORAL at 05:12

## 2022-06-09 RX ADMIN — BUDESONIDE AND FORMOTEROL FUMARATE DIHYDRATE 2 PUFF(S): 160; 4.5 AEROSOL RESPIRATORY (INHALATION) at 05:12

## 2022-06-09 RX ADMIN — OXYCODONE HYDROCHLORIDE 10 MILLIGRAM(S): 5 TABLET ORAL at 00:19

## 2022-06-09 RX ADMIN — LOSARTAN POTASSIUM 100 MILLIGRAM(S): 100 TABLET, FILM COATED ORAL at 05:12

## 2022-06-09 NOTE — PROGRESS NOTE ADULT - SUBJECTIVE AND OBJECTIVE BOX
ORTHO PROGRESS NOTE     Pt seen and examined at bedside, denies SOB, CP, Dizziness. N/V/D /HA.  No significant overnight events. Pain well controlled. Patient ambulated with PT.     Vital Signs Last 24 Hrs  T(C): 36.8 (09 Jun 2022 05:05), Max: 36.9 (09 Jun 2022 00:24)  T(F): 98.3 (09 Jun 2022 05:05), Max: 98.5 (09 Jun 2022 00:24)  HR: 66 (09 Jun 2022 05:05) (57 - 70)  BP: 125/68 (09 Jun 2022 05:05) (111/57 - 139/76)  BP(mean): --  RR: 18 (09 Jun 2022 05:05) (16 - 18)  SpO2: 97% (09 Jun 2022 05:05) (97% - 100%)    Gen: No apparent distress  Left LE:  Dressing C/D/I  HV in place / HV d/c'd   BLE: motor intact EHL/FHL/TA/GS .  Sensation is grossly intact to light touch in the distal extremities.  Compartments are soft bilaterally.  Extremities are warm .  DP 2+ BLE     Labs:  CBC Full  -  ( 07 Jun 2022 06:50 )  WBC Count : 11.27 K/uL  RBC Count : 3.43 M/uL  Hemoglobin : 10.4 g/dL  Hematocrit : 33.1 %  Platelet Count - Automated : 181 K/uL  Mean Cell Volume : 96.5 fl  Mean Cell Hemoglobin : 30.3 pg  Mean Cell Hemoglobin Concentration : 31.4 gm/dL  Auto Neutrophil # : x  Auto Lymphocyte # : x  Auto Monocyte # : x  Auto Eosinophil # : x  Auto Basophil # : x  Auto Neutrophil % : x  Auto Lymphocyte % : x  Auto Monocyte % : x  Auto Eosinophil % : x  Auto Basophil % : x        06-07    139  |  105  |  26<H>  ----------------------------<  206<H>  5.0   |  25  |  1.34<H>    Ca    9.0      07 Jun 2022 06:51           A/P  Pt is a -----  yo -----s/p Knee Arthroplasty, POD #___    - Pain control/ Analgesia  - DVT ppx--------, foot pumps  -PT/OT - wbat/oob    - Incentive Spirometer  - Monitor HV output   - notify Ortho for questions   -Dispo: Pending PT/OT recs.    Christina Carlos PA-C  Team Pager #[phone] ORTHO PROGRESS NOTE     Pt seen and examined at bedside, denies SOB, CP, Dizziness. N/V/D /HA.  No significant overnight events. Pain well controlled. Patient ambulated with PT.     Vital Signs Last 24 Hrs  T(C): 36.8 (09 Jun 2022 05:05), Max: 36.9 (09 Jun 2022 00:24)  T(F): 98.3 (09 Jun 2022 05:05), Max: 98.5 (09 Jun 2022 00:24)  HR: 66 (09 Jun 2022 05:05) (57 - 70)  BP: 125/68 (09 Jun 2022 05:05) (111/57 - 139/76)  BP(mean): --  RR: 18 (09 Jun 2022 05:05) (16 - 18)  SpO2: 97% (09 Jun 2022 05:05) (97% - 100%)    Gen: No apparent distress  Left LE:  Dressing C/D/I    BLE: motor intact EHL/FHL/TA/GS .    Sensation is grossly intact to light touch in the distal extremities.    Compartments are soft bilaterally.    Extremities are warm .  DP 2+ BLE     Labs:  CBC Full  -  ( 07 Jun 2022 06:50 )  WBC Count : 11.27 K/uL  RBC Count : 3.43 M/uL  Hemoglobin : 10.4 g/dL  Hematocrit : 33.1 %  Platelet Count - Automated : 181 K/uL  Mean Cell Volume : 96.5 fl  Mean Cell Hemoglobin : 30.3 pg  Mean Cell Hemoglobin Concentration : 31.4 gm/dL      06-07    139  |  105  |  26<H>  ----------------------------<  206<H>  5.0   |  25  |  1.34<H>    Ca    9.0      07 Jun 2022 06:51           A/P  Pt is a 81 yo female s/p left total knee arthroplasty, POD #3    - Pain control/ Analgesia  - DVT ppx  BID  - PT/OT - wbat/oob    - Incentive Spirometer   - notify Ortho for questions  - Dispo: Patient to be discharged home with home PT today.      Christina Carlos PA-C  Team Pager #[phone]

## 2022-06-13 PROBLEM — M17.12 UNILATERAL PRIMARY OSTEOARTHRITIS, LEFT KNEE: Chronic | Status: ACTIVE | Noted: 2022-05-16

## 2022-06-13 PROBLEM — Z86.79 PERSONAL HISTORY OF OTHER DISEASES OF THE CIRCULATORY SYSTEM: Chronic | Status: ACTIVE | Noted: 2022-05-16

## 2022-06-13 PROBLEM — E66.9 OBESITY, UNSPECIFIED: Chronic | Status: ACTIVE | Noted: 2022-05-16

## 2022-06-13 PROBLEM — Z87.891 PERSONAL HISTORY OF NICOTINE DEPENDENCE: Chronic | Status: ACTIVE | Noted: 2022-05-16

## 2022-06-22 ENCOUNTER — APPOINTMENT (OUTPATIENT)
Dept: ORTHOPEDIC SURGERY | Facility: CLINIC | Age: 82
End: 2022-06-22
Payer: MEDICARE

## 2022-06-22 DIAGNOSIS — K59.00 CONSTIPATION, UNSPECIFIED: ICD-10-CM

## 2022-06-22 PROCEDURE — 73562 X-RAY EXAM OF KNEE 3: CPT | Mod: LT

## 2022-06-22 PROCEDURE — 99024 POSTOP FOLLOW-UP VISIT: CPT

## 2022-06-22 RX ORDER — SENNOSIDES 8.6 MG TABLETS 8.6 MG/1
8.6 TABLET ORAL
Qty: 15 | Refills: 0 | Status: ACTIVE | COMMUNITY
Start: 2022-06-22 | End: 1900-01-01

## 2022-06-22 RX ORDER — DOCUSATE SODIUM 100 MG
100 TABLET ORAL DAILY
Qty: 14 | Refills: 0 | Status: ACTIVE | COMMUNITY
Start: 2022-06-22 | End: 1900-01-01

## 2022-06-22 RX ORDER — TRAMADOL HYDROCHLORIDE 50 MG/1
50 TABLET, COATED ORAL
Qty: 30 | Refills: 0 | Status: ACTIVE | COMMUNITY
Start: 2022-06-22 | End: 1900-01-01

## 2022-07-14 ENCOUNTER — APPOINTMENT (OUTPATIENT)
Dept: ORTHOPEDIC SURGERY | Facility: CLINIC | Age: 82
End: 2022-07-14

## 2022-07-14 VITALS
WEIGHT: 180 LBS | HEIGHT: 60 IN | SYSTOLIC BLOOD PRESSURE: 139 MMHG | BODY MASS INDEX: 35.34 KG/M2 | DIASTOLIC BLOOD PRESSURE: 80 MMHG

## 2022-07-14 PROCEDURE — 99024 POSTOP FOLLOW-UP VISIT: CPT

## 2022-09-06 ENCOUNTER — APPOINTMENT (OUTPATIENT)
Dept: PULMONOLOGY | Facility: CLINIC | Age: 82
End: 2022-09-06

## 2022-09-23 ENCOUNTER — APPOINTMENT (OUTPATIENT)
Dept: ORTHOPEDIC SURGERY | Facility: CLINIC | Age: 82
End: 2022-09-23

## 2022-09-23 VITALS — WEIGHT: 180 LBS | HEIGHT: 60 IN | BODY MASS INDEX: 35.34 KG/M2

## 2022-09-23 DIAGNOSIS — Z96.652 PRESENCE OF LEFT ARTIFICIAL KNEE JOINT: ICD-10-CM

## 2022-09-23 PROCEDURE — 99213 OFFICE O/P EST LOW 20 MIN: CPT

## 2022-09-23 PROCEDURE — 73562 X-RAY EXAM OF KNEE 3: CPT | Mod: LT

## 2022-09-29 ENCOUNTER — APPOINTMENT (OUTPATIENT)
Dept: ORTHOPEDIC SURGERY | Facility: CLINIC | Age: 82
End: 2022-09-29

## 2022-09-29 VITALS
SYSTOLIC BLOOD PRESSURE: 150 MMHG | BODY MASS INDEX: 35.34 KG/M2 | HEIGHT: 60 IN | WEIGHT: 180 LBS | HEART RATE: 59 BPM | DIASTOLIC BLOOD PRESSURE: 60 MMHG

## 2022-09-29 DIAGNOSIS — M75.41 IMPINGEMENT SYNDROME OF RIGHT SHOULDER: ICD-10-CM

## 2022-09-29 PROCEDURE — 20610 DRAIN/INJ JOINT/BURSA W/O US: CPT | Mod: RT

## 2022-09-29 PROCEDURE — 99214 OFFICE O/P EST MOD 30 MIN: CPT | Mod: 25

## 2022-10-14 RX ORDER — DICLOFENAC SODIUM 1% 10 MG/G
1 GEL TOPICAL
Qty: 1 | Refills: 1 | Status: ACTIVE | COMMUNITY
Start: 2022-10-14 | End: 1900-01-01

## 2022-11-16 RX ORDER — MONTELUKAST 10 MG/1
10 TABLET, FILM COATED ORAL
Qty: 90 | Refills: 1 | Status: ACTIVE | COMMUNITY
Start: 2022-05-10
